# Patient Record
Sex: MALE | Race: WHITE | NOT HISPANIC OR LATINO | Employment: PART TIME | URBAN - METROPOLITAN AREA
[De-identification: names, ages, dates, MRNs, and addresses within clinical notes are randomized per-mention and may not be internally consistent; named-entity substitution may affect disease eponyms.]

---
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
## 2020-05-13 ENCOUNTER — TELEPHONE (OUTPATIENT)
Dept: INTERNAL MEDICINE | Facility: CLINIC | Age: 20
End: 2020-05-13

## 2020-05-13 NOTE — TELEPHONE ENCOUNTER
Please call pt and obtain where his last opth exam was.  If he has not had in the last one year then I will order one for him  If he has had one then I need to review and scan to chart.

## 2020-08-29 ENCOUNTER — TELEPHONE (OUTPATIENT)
Dept: INTERNAL MEDICINE | Facility: CLINIC | Age: 20
End: 2020-08-29

## 2020-08-29 DIAGNOSIS — E10.9 TYPE 1 DIABETES MELLITUS WITHOUT COMPLICATION (HCC): Primary | ICD-10-CM

## 2020-09-03 ENCOUNTER — TELEPHONE (OUTPATIENT)
Dept: INTERNAL MEDICINE | Facility: CLINIC | Age: 20
End: 2020-09-03

## 2020-09-03 NOTE — TELEPHONE ENCOUNTER
Attempted to reach patient at [phone] [facility] to call back. Gave office number

## 2020-10-13 ENCOUNTER — TELEPHONE (OUTPATIENT)
Dept: INTERNAL MEDICINE | Facility: CLINIC | Age: 20
End: 2020-10-13

## 2020-10-13 NOTE — TELEPHONE ENCOUNTER
----- Message from DIXIE Leung sent at 10/4/2020 10:11 PM EDT -----  Sent standard and certified.  If no response can we dismiss? TY  ----- Message -----  From: Kaitlynn Cobos  Sent: 8/31/2020   9:15 AM EDT  To: DIXIE Leung    I see where you have sent letters but I don't see that they have been sent via certified mail.  It could be we just never received the card back from the post office.  If you feel you sent a letter certified then yes we can dismiss from the practice.    Thanks  ----- Message -----  From: Bethany Townsend APRN  Sent: 8/29/2020   6:01 PM EDT  To: Kaitlynn Cobos    Would this person be up for dismissal as he has uncontrolled DM and I cannot get him in the office.

## 2020-12-03 DIAGNOSIS — T78.40XA ALLERGY, INITIAL ENCOUNTER: ICD-10-CM

## 2020-12-03 NOTE — TELEPHONE ENCOUNTER
LOV:12/04/2019  Next OV:n/a  Last Rx:12/04/2019  Quantity:30  Refills:11  Tried reaching Pt. Left voicemail message on [phone] and [phone] requesting Pt to call office verifying if he is still a Pt at the practice. Office number given [phone].

## 2020-12-09 NOTE — TELEPHONE ENCOUNTER
Lov:12/04/2019  Next OV:n/a  Last Rx:12/04/2019  Quantity:30  Refills:11  Left Pt voicemail message to call office to   1. Determine if he is still a Pt at the practice  2. Set up appointment since last OV was 12/04/2019  Office number given [phone].

## 2020-12-11 RX ORDER — MONTELUKAST SODIUM 10 MG/1
10 TABLET ORAL NIGHTLY
Qty: 30 TABLET | Refills: 10 | OUTPATIENT
Start: 2020-12-11

## 2021-01-07 ENCOUNTER — OFFICE VISIT (OUTPATIENT)
Dept: ENDOCRINOLOGY | Facility: CLINIC | Age: 21
End: 2021-01-07

## 2021-01-07 VITALS
DIASTOLIC BLOOD PRESSURE: 84 MMHG | OXYGEN SATURATION: 98 % | HEART RATE: 86 BPM | SYSTOLIC BLOOD PRESSURE: 128 MMHG | HEIGHT: 68 IN | BODY MASS INDEX: 29.67 KG/M2 | TEMPERATURE: 98.4 F | WEIGHT: 195.8 LBS

## 2021-01-07 DIAGNOSIS — E10.65 TYPE 1 DIABETES MELLITUS WITH HYPERGLYCEMIA (HCC): Primary | ICD-10-CM

## 2021-01-07 LAB
EXPIRATION DATE: NORMAL
HBA1C MFR BLD: 7.4 %
Lab: NORMAL

## 2021-01-07 PROCEDURE — 99213 OFFICE O/P EST LOW 20 MIN: CPT | Performed by: PHYSICIAN ASSISTANT

## 2021-01-07 PROCEDURE — 83036 HEMOGLOBIN GLYCOSYLATED A1C: CPT | Performed by: PHYSICIAN ASSISTANT

## 2021-01-07 PROCEDURE — 95251 CONT GLUC MNTR ANALYSIS I&R: CPT | Performed by: PHYSICIAN ASSISTANT

## 2021-01-07 RX ORDER — PROCHLORPERAZINE 25 MG/1
SUPPOSITORY RECTAL
COMMUNITY
Start: 2020-10-05 | End: 2021-01-07 | Stop reason: SDUPTHER

## 2021-01-07 RX ORDER — PROCHLORPERAZINE 25 MG/1
1 SUPPOSITORY RECTAL
Qty: 1 EACH | Refills: 3 | Status: SHIPPED | OUTPATIENT
Start: 2021-01-07 | End: 2022-05-19 | Stop reason: SDUPTHER

## 2021-01-07 RX ORDER — PROCHLORPERAZINE 25 MG/1
SUPPOSITORY RECTAL
COMMUNITY
Start: 2020-11-23 | End: 2021-01-07 | Stop reason: SDUPTHER

## 2021-01-07 RX ORDER — PROCHLORPERAZINE 25 MG/1
SUPPOSITORY RECTAL
Qty: 6 EACH | Refills: 3 | Status: SHIPPED | OUTPATIENT
Start: 2021-01-07 | End: 2021-10-19

## 2021-01-07 NOTE — PROGRESS NOTES
"     Office Note      Date: 2021  Patient Name: Darien Wylie  MRN: [id number]  : 2000    Chief Complaint   Patient presents with   • Diabetes       History of Present Illness:   Darien Wylie is a 20 y.o. male who presents for Type 1 diabetes.  He remains on his tandem insulin pump using NovoLog insulin and the Dexcom continuous glucose monitor.  He upgraded to control IQ over the summer.  He reports his blood sugars were little high over the holidays but overall they have been okay he does not feel the pump is giving him enough insulin to keep these down..  He denies any severe or frequent hypoglycemia.   He reports he is going to school and just got engaged they plan to get  in .  He had his annual eye exam  of this year and all was okay.  He is unsure if he had his flu vaccine this  or not but would not like to get one today.  He denies any trouble with his feet today.      Subjective     Review of Systems:   Review of Systems   Constitutional: Negative.    Cardiovascular: Negative.    Gastrointestinal: Negative.    Endocrine: Negative.    Neurological: Negative.        The following portions of the patient's history were reviewed and updated as appropriate: allergies, current medications, past family history, past medical history, past social history, past surgical history and problem list.    Objective     Vitals:    21 1523   BP: 128/84   Pulse: 86   Temp: 98.4 °F (36.9 °C)   TempSrc: Infrared   SpO2: 98%   Weight: 88.8 kg (195 lb 12.8 oz)   Height: 172.7 cm (68\")   PainSc: 0-No pain     Body mass index is 29.77 kg/m².    Physical Exam  Constitutional:       General: He is not in acute distress.     Appearance: Normal appearance.   Neurological:      Mental Status: He is alert.         HEMOGLOBIN A1C  Lab Results   Component Value Date    HGBA1C 7.4 2021           Assessment / Plan      Assessment & Plan:  1. Type 1 diabetes mellitus with " hyperglycemia (CMS/[facility])  A1c today 7.4%.  This is slightly above goal.  Reviewed tandem pump download with CGM tracings.  Increased his basal rate at 7 AM by 0.1 and changed his correction factor from 35 to 30 try to improve hyperglycemia.  He will send in blood sugar readings as needed for review and adjustment.  Refilled NovoLog today.  He inquired about getting his Dexcom supplies through [employer] company.  Advised him he will have to check into this with his insurance and he will let me know if he decides to change.  For now we will send a prescription to his pharmacy.  We will plan to check fasting labs and do foot exam next appointment for monitoring.  Blood pressure okay today  Encouraged healthy eating habits and physical activity.  - POC Glycosylated Hemoglobin (Hb A1C)      Return in about 3 months (around 4/7/2021) for Recheck.     Virgen Motta PA-C  01/07/2021

## 2021-04-08 ENCOUNTER — OFFICE VISIT (OUTPATIENT)
Dept: ENDOCRINOLOGY | Facility: CLINIC | Age: 21
End: 2021-04-08

## 2021-04-08 ENCOUNTER — LAB (OUTPATIENT)
Dept: LAB | Facility: HOSPITAL | Age: 21
End: 2021-04-08

## 2021-04-08 VITALS
HEIGHT: 69 IN | BODY MASS INDEX: 29.21 KG/M2 | DIASTOLIC BLOOD PRESSURE: 80 MMHG | WEIGHT: 197.2 LBS | SYSTOLIC BLOOD PRESSURE: 138 MMHG | TEMPERATURE: 98.2 F

## 2021-04-08 DIAGNOSIS — E10.65 TYPE 1 DIABETES MELLITUS WITH HYPERGLYCEMIA (HCC): ICD-10-CM

## 2021-04-08 DIAGNOSIS — R03.0 ELEVATED BLOOD PRESSURE READING: ICD-10-CM

## 2021-04-08 DIAGNOSIS — Z96.41 DIABETES MELLITUS TYPE 1, UNCOMPLICATED, ON LONG TERM INSULIN PUMP (HCC): ICD-10-CM

## 2021-04-08 DIAGNOSIS — E10.65 TYPE 1 DIABETES MELLITUS WITH HYPERGLYCEMIA (HCC): Primary | ICD-10-CM

## 2021-04-08 DIAGNOSIS — E10.9 DIABETES MELLITUS TYPE 1, UNCOMPLICATED, ON LONG TERM INSULIN PUMP (HCC): ICD-10-CM

## 2021-04-08 LAB
ALBUMIN SERPL-MCNC: 4.7 G/DL (ref 3.5–5.2)
ALBUMIN UR-MCNC: <1.2 MG/DL
ALBUMIN/GLOB SERPL: 1.7 G/DL
ALP SERPL-CCNC: 81 U/L (ref 39–117)
ALT SERPL W P-5'-P-CCNC: 19 U/L (ref 1–41)
ANION GAP SERPL CALCULATED.3IONS-SCNC: 9.7 MMOL/L (ref 5–15)
AST SERPL-CCNC: 16 U/L (ref 1–40)
BILIRUB SERPL-MCNC: 0.5 MG/DL (ref 0–1.2)
BUN SERPL-MCNC: 9 MG/DL (ref 6–20)
BUN/CREAT SERPL: 11.7 (ref 7–25)
CALCIUM SPEC-SCNC: 9.9 MG/DL (ref 8.6–10.5)
CHLORIDE SERPL-SCNC: 104 MMOL/L (ref 98–107)
CHOLEST SERPL-MCNC: 126 MG/DL (ref 0–200)
CO2 SERPL-SCNC: 27.3 MMOL/L (ref 22–29)
CREAT SERPL-MCNC: 0.77 MG/DL (ref 0.76–1.27)
CREAT UR-MCNC: 223.8 MG/DL
EXPIRATION DATE: NORMAL
GFR SERPL CREATININE-BSD FRML MDRD: 129 ML/MIN/1.73
GLOBULIN UR ELPH-MCNC: 2.7 GM/DL
GLUCOSE SERPL-MCNC: 81 MG/DL (ref 65–99)
HBA1C MFR BLD: 6.9 %
HDLC SERPL-MCNC: 58 MG/DL (ref 40–60)
LDLC SERPL CALC-MCNC: 56 MG/DL (ref 0–100)
LDLC/HDLC SERPL: 0.99 {RATIO}
Lab: NORMAL
MICROALBUMIN/CREAT UR: NORMAL MG/G{CREAT}
POTASSIUM SERPL-SCNC: 4.4 MMOL/L (ref 3.5–5.2)
PROT SERPL-MCNC: 7.4 G/DL (ref 6–8.5)
SODIUM SERPL-SCNC: 141 MMOL/L (ref 136–145)
TRIGL SERPL-MCNC: 53 MG/DL (ref 0–150)
TSH SERPL DL<=0.05 MIU/L-ACNC: 3.44 UIU/ML (ref 0.27–4.2)
VLDLC SERPL-MCNC: 12 MG/DL (ref 5–40)

## 2021-04-08 PROCEDURE — 99213 OFFICE O/P EST LOW 20 MIN: CPT | Performed by: PHYSICIAN ASSISTANT

## 2021-04-08 PROCEDURE — 82043 UR ALBUMIN QUANTITATIVE: CPT

## 2021-04-08 PROCEDURE — 80061 LIPID PANEL: CPT

## 2021-04-08 PROCEDURE — 80053 COMPREHEN METABOLIC PANEL: CPT

## 2021-04-08 PROCEDURE — 83036 HEMOGLOBIN GLYCOSYLATED A1C: CPT | Performed by: PHYSICIAN ASSISTANT

## 2021-04-08 PROCEDURE — 84443 ASSAY THYROID STIM HORMONE: CPT

## 2021-04-08 PROCEDURE — 95251 CONT GLUC MNTR ANALYSIS I&R: CPT | Performed by: PHYSICIAN ASSISTANT

## 2021-04-08 PROCEDURE — 82570 ASSAY OF URINE CREATININE: CPT

## 2021-04-08 RX ORDER — SUBCUTANEOUS INSULIN PUMP
EACH MISCELLANEOUS
COMMUNITY

## 2021-04-08 RX ORDER — MONTELUKAST SODIUM 10 MG/1
TABLET ORAL AS NEEDED
COMMUNITY
End: 2021-10-21 | Stop reason: HOSPADM
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
## 2021-06-08 RX ORDER — INFUSION SET FOR INSULIN PUMP
INFUSION SETS-PARAPHERNALIA MISCELLANEOUS
Qty: 45 EACH | Refills: 1 | Status: SHIPPED | OUTPATIENT
Start: 2021-06-08 | End: 2021-11-08

## 2021-06-08 RX ORDER — INSULIN PUMP CARTRIDGE
CARTRIDGE (EA) SUBCUTANEOUS
Qty: 45 EACH | Refills: 3 | OUTPATIENT
Start: 2021-06-08

## 2021-06-08 RX ORDER — INFUSION SET FOR INSULIN PUMP
1 INFUSION SETS-PARAPHERNALIA MISCELLANEOUS
Qty: 40 EACH | Refills: 3 | OUTPATIENT
Start: 2021-06-08

## 2021-06-08 RX ORDER — INSULIN PUMP CARTRIDGE
CARTRIDGE (EA) SUBCUTANEOUS
Qty: 45 EACH | Refills: 1 | Status: SHIPPED | OUTPATIENT
Start: 2021-06-08 | End: 2021-11-08

## 2021-06-11 ENCOUNTER — PRIOR AUTHORIZATION (OUTPATIENT)
Dept: ENDOCRINOLOGY | Facility: CLINIC | Age: 21
End: 2021-06-11

## 2021-06-11 NOTE — TELEPHONE ENCOUNTER
GUS ORDONEZ Key: [id number] - PA Case ID: [id number] help? Call us at [phone]  Outcome  Appro[place] Briana 10  Your PA request has been approved. Additional information will be provided in the approval communication. (Message 1145)  Drug  T:slim t:lock Insulin Cart 3ml  Form  [employer] Electronic PA Form (2017 [employer])

## 2021-07-13 ENCOUNTER — OFFICE VISIT (OUTPATIENT)
Dept: ENDOCRINOLOGY | Facility: CLINIC | Age: 21
End: 2021-07-13

## 2021-07-13 VITALS
HEIGHT: 68 IN | BODY MASS INDEX: 30.01 KG/M2 | OXYGEN SATURATION: 99 % | WEIGHT: 198 LBS | SYSTOLIC BLOOD PRESSURE: 128 MMHG | DIASTOLIC BLOOD PRESSURE: 64 MMHG | HEART RATE: 55 BPM

## 2021-07-13 DIAGNOSIS — E10.65 TYPE 1 DIABETES MELLITUS WITH HYPERGLYCEMIA (HCC): Primary | ICD-10-CM

## 2021-07-13 LAB
EXPIRATION DATE: ABNORMAL
EXPIRATION DATE: NORMAL
GLUCOSE BLDC GLUCOMTR-MCNC: 145 MG/DL (ref 70–130)
HBA1C MFR BLD: 7.1 %
Lab: ABNORMAL
Lab: NORMAL

## 2021-07-13 PROCEDURE — 83036 HEMOGLOBIN GLYCOSYLATED A1C: CPT | Performed by: PHYSICIAN ASSISTANT

## 2021-07-13 PROCEDURE — 99213 OFFICE O/P EST LOW 20 MIN: CPT | Performed by: PHYSICIAN ASSISTANT

## 2021-07-13 PROCEDURE — 95251 CONT GLUC MNTR ANALYSIS I&R: CPT | Performed by: PHYSICIAN ASSISTANT

## 2021-07-13 PROCEDURE — 3051F HG A1C>EQUAL 7.0%<8.0%: CPT | Performed by: PHYSICIAN ASSISTANT

## 2021-07-13 NOTE — PROGRESS NOTES
"     Office Note      Date: 2021  Patient Name: Darien Wylie  MRN: [id number]  : 2000    Chief Complaint   Patient presents with   • Diabetes       History of Present Illness:   Darien Wylie is a 20 y.o. male who presents for follow-up for type 1 diabetes.  He remains on his tandem insulin pump with G6 continuous glucose monitor and NovoLog insulin.  He got  in  and all went well.  He reports his blood sugars have been okay he has an occasional low blood sugar but does not have these on a regular basis.  He had trouble getting his tandem pump to download today but his control IQ is on.  Reports he has noted higher readings in the evenings and after lunch.  He is up-to-date on his eye exam he goes annually in January.  He has had his first dose of his Covid vaccine.  He denies any trouble with his feet today we did his foot exam and fasting labs at his appointment in April.    Subjective     Review of Systems:   Review of Systems   Constitutional: Negative.    Cardiovascular: Negative.    Endocrine: Negative.    Neurological: Negative.        The following portions of the patient's history were reviewed and updated as appropriate: allergies, current medications, past family history, past medical history, past social history, past surgical history and problem list.    Objective     Vitals:    21 0920   BP: 128/64   BP Location: Left arm   Patient Position: Sitting   Cuff Size: Adult   Pulse: 55   SpO2: 99%   Weight: 89.8 kg (198 lb)   Height: 172.7 cm (68\")   PainSc: 0-No pain     Body mass index is 30.11 kg/m².    Physical Exam    HEMOGLOBIN A1C  Lab Results   Component Value Date    HGBA1C 7.1 2021       GLUCOSE  Glucose   Date Value Ref Range Status   2021 145 (A) 70 - 130 mg/dL Final       CMP  Lab Results   Component Value Date    GLUCOSE 81 2021    BUN 9 2021    CREATININE 0.77 2021    EGFRIFNONA 129 2021    BCR 11.7 " 04/08/2021    K 4.4 04/08/2021    CO2 27.3 04/08/2021    CALCIUM 9.9 04/08/2021    AST 16 04/08/2021    ALT 19 04/08/2021       LIPID PANEL  Lab Results   Component Value Date    CHOL 126 04/08/2021    TRIG 53 04/08/2021    HDL 58 04/08/2021    LDL 56 04/08/2021       URINE MICROALBUMIN/CREATININE RATIO  Microalbumin/Creatinine Ratio   Date Value Ref Range Status   04/08/2021   Final     Comment:     Unable to calculate       TSH  Lab Results   Component Value Date    TSH 3.440 04/08/2021       Assessment / Plan      Assessment & Plan:  1. Type 1 diabetes mellitus with hyperglycemia (CMS/[facility])  His A1c is up some as compared to April at 7.1%.  I was able to review his Dexcom download and we will increase his bolus by 1 unit with lunch and supper.  He will send in blood sugars for review and adjustment as needed.  Refilled his insulin today.  Blood pressure was okay today.  His fiancée has been checking this at home and it is typically been in the 130s over 80 or less.  We will not plan to add any blood pressure medication at this time.  We will continue to monitor this.  Patient to call as needed  - POC Glycosylated Hemoglobin (Hb A1C)  - POC Glucose, Blood      Return in about 3 months (around 10/13/2021) for Recheck 3-4.     MARY Reeder-C  07/13/2021

## 2021-10-19 RX ORDER — PROCHLORPERAZINE 25 MG/1
SUPPOSITORY RECTAL
Qty: 6 EACH | Refills: 1 | Status: SHIPPED | OUTPATIENT
Start: 2021-10-19 | End: 2021-10-21

## 2021-10-21 ENCOUNTER — OFFICE VISIT (OUTPATIENT)
Dept: ENDOCRINOLOGY | Facility: CLINIC | Age: 21
End: 2021-10-21

## 2021-10-21 VITALS
HEART RATE: 61 BPM | BODY MASS INDEX: 29.37 KG/M2 | DIASTOLIC BLOOD PRESSURE: 68 MMHG | OXYGEN SATURATION: 98 % | WEIGHT: 193.8 LBS | HEIGHT: 68 IN | SYSTOLIC BLOOD PRESSURE: 122 MMHG

## 2021-10-21 DIAGNOSIS — E10.65 TYPE 1 DIABETES MELLITUS WITH HYPERGLYCEMIA (HCC): Primary | ICD-10-CM

## 2021-10-21 LAB
EXPIRATION DATE: ABNORMAL
EXPIRATION DATE: NORMAL
GLUCOSE BLDC GLUCOMTR-MCNC: 134 MG/DL (ref 70–130)
HBA1C MFR BLD: 6.7 %
Lab: ABNORMAL
Lab: NORMAL

## 2021-10-21 PROCEDURE — 82947 ASSAY GLUCOSE BLOOD QUANT: CPT | Performed by: PHYSICIAN ASSISTANT

## 2021-10-21 PROCEDURE — 99213 OFFICE O/P EST LOW 20 MIN: CPT | Performed by: PHYSICIAN ASSISTANT

## 2021-10-21 PROCEDURE — 83036 HEMOGLOBIN GLYCOSYLATED A1C: CPT | Performed by: PHYSICIAN ASSISTANT

## 2021-10-21 RX ORDER — PROCHLORPERAZINE 25 MG/1
SUPPOSITORY RECTAL
Qty: 9 EACH | Refills: 3 | Status: SHIPPED | OUTPATIENT
Start: 2021-10-21 | End: 2022-05-19 | Stop reason: SDUPTHER

## 2021-10-21 NOTE — PROGRESS NOTES
"     Office Note      Date: 10/21/2021  Patient Name: Darien Wylie  MRN: [id number]  : 2000    Chief Complaint   Patient presents with   • Diabetes       History of Present Illness:   Darien Wylie is a 21 y.o. male who presents for follow-up for type 1 diabetes.  The dates in his pump were off so we could not get this to download correctly.  Date was set to July instead of October.  He remains on the tandem insulin pump with Dexcom G6 continuous glucose monitor and NovoLog insulin.  He reports he has been off his Dexcom the last week because he was having trouble with his prescription.  He reports his blood sugars have been okay but he has noticed they are higher after he eats in the afternoons.  He denies any severe or frequent hypoglycemia.  He reports he is up-to-date on his eye exam he goes annually in January.  He has had both doses of his Covid vaccine and plans to get his flu vaccine this .  He denies any trouble with his feet today we did his foot exam as well as his fasting labs in the spring.      Subjective     Review of Systems:   Review of Systems   Constitutional: Negative.    Cardiovascular: Negative.    Gastrointestinal: Negative.    Endocrine: Negative.    Neurological: Negative.        The following portions of the patient's history were reviewed and updated as appropriate: allergies, current medications, past family history, past medical history, past social history, past surgical history and problem list.    Objective     Vitals:    10/21/21 1058   BP: 122/68   BP Location: Left arm   Patient Position: Sitting   Cuff Size: Adult   Pulse: 61   SpO2: 98%   Weight: 87.9 kg (193 lb 12.8 oz)   Height: 172.7 cm (68\")     Body mass index is 29.47 kg/m².    Physical Exam  Vitals reviewed.   Constitutional:       General: He is not in acute distress.     Appearance: Normal appearance.   Neurological:      Mental Status: He is alert.         HEMOGLOBIN A1C  Lab Results "   Component Value Date    HGBA1C 6.7 10/21/2021       GLUCOSE  Glucose   Date Value Ref Range Status   10/21/2021 134 (A) 70 - 130 mg/dL Final       CMP  Lab Results   Component Value Date    GLUCOSE 81 04/08/2021    BUN 9 04/08/2021    CREATININE 0.77 04/08/2021    EGFRIFNONA 129 04/08/2021    BCR 11.7 04/08/2021    K 4.4 04/08/2021    CO2 27.3 04/08/2021    CALCIUM 9.9 04/08/2021    AST 16 04/08/2021    ALT 19 04/08/2021       LIPID PANEL  Lab Results   Component Value Date    CHOL 126 04/08/2021    TRIG 53 04/08/2021    HDL 58 04/08/2021    LDL 56 04/08/2021       URINE MICROALBUMIN/CREATININE RATIO  Microalbumin/Creatinine Ratio   Date Value Ref Range Status   04/08/2021   Final     Comment:     Unable to calculate       TSH  Lab Results   Component Value Date    TSH 3.440 04/08/2021       Assessment / Plan      Assessment & Plan:  1. Type 1 diabetes mellitus with hyperglycemia (HCC)  His A1c is excellent today at 6.7%.  He has had some higher readings in the afternoons and evenings.  We changed his insulin to carb ratio from 1-8 to 1-6 during the day to try to improve hyperglycemia.  We are unsure of the accuracy of his tandem download since his date was set to July instead of October.  We corrected the date and time in his pump today.  I encouraged him to send in blood sugar readings for review and adjustment as needed.  I refilled his Dexcom sensors today.  Blood pressures okay today.  His weight is down 5 pounds since his appointment in July.  I encouraged healthy eating habits and physical activity as tolerated.  - POC Glycosylated Hemoglobin (Hb A1C)  - POC Glucose, Blood      Return in about 3 months (around 1/21/2022) for Recheck 3-4 mos.     Virgen Motta PA-C  10/21/2021

## 2021-11-08 RX ORDER — INFUSION SET FOR INSULIN PUMP
INFUSION SETS-PARAPHERNALIA MISCELLANEOUS
Qty: 45 EACH | Refills: 1 | Status: SHIPPED | OUTPATIENT
Start: 2021-11-08 | End: 2022-09-06

## 2021-11-08 RX ORDER — INSULIN PUMP CARTRIDGE
CARTRIDGE (EA) SUBCUTANEOUS
Qty: 45 EACH | Refills: 1 | Status: SHIPPED | OUTPATIENT
Start: 2021-11-08 | End: 2022-09-06

## 2021-11-16 ENCOUNTER — OFFICE VISIT (OUTPATIENT)
Dept: INTERNAL MEDICINE | Facility: CLINIC | Age: 21
End: 2021-11-16

## 2021-11-16 VITALS
TEMPERATURE: 97.1 F | HEART RATE: 77 BPM | DIASTOLIC BLOOD PRESSURE: 76 MMHG | WEIGHT: 195 LBS | BODY MASS INDEX: 29.55 KG/M2 | SYSTOLIC BLOOD PRESSURE: 118 MMHG | HEIGHT: 68 IN | RESPIRATION RATE: 20 BRPM | OXYGEN SATURATION: 98 %

## 2021-11-16 DIAGNOSIS — N50.812 PAIN IN BOTH TESTICLES: Primary | ICD-10-CM

## 2021-11-16 DIAGNOSIS — N50.82 SCROTAL PAIN: ICD-10-CM

## 2021-11-16 DIAGNOSIS — K64.4 EXTERNAL HEMORRHOID: ICD-10-CM

## 2021-11-16 DIAGNOSIS — N50.811 PAIN IN BOTH TESTICLES: Primary | ICD-10-CM

## 2021-11-16 PROBLEM — Z96.41 PRESENCE OF INSULIN PUMP: Status: ACTIVE | Noted: 2019-02-28

## 2021-11-16 LAB
BILIRUB BLD-MCNC: NEGATIVE MG/DL
CLARITY, POC: CLEAR
COLOR UR: YELLOW
EXPIRATION DATE: ABNORMAL
GLUCOSE UR STRIP-MCNC: NEGATIVE MG/DL
KETONES UR QL: NEGATIVE
LEUKOCYTE EST, POC: NEGATIVE
Lab: ABNORMAL
NITRITE UR-MCNC: NEGATIVE MG/ML
PH UR: 6 [PH] (ref 5–8)
PROT UR STRIP-MCNC: NEGATIVE MG/DL
RBC # UR STRIP: NEGATIVE /UL
SP GR UR: 1.03 (ref 1–1.03)
UROBILINOGEN UR QL: NORMAL

## 2021-11-16 PROCEDURE — 81003 URINALYSIS AUTO W/O SCOPE: CPT | Performed by: PHYSICIAN ASSISTANT

## 2021-11-16 PROCEDURE — 99213 OFFICE O/P EST LOW 20 MIN: CPT | Performed by: PHYSICIAN ASSISTANT

## 2021-11-16 NOTE — PROGRESS NOTES
[facility]  [address]  [facility] [address]  Dept: [phone]  Dept Fax: [phone]  Loc: [phone]  Loc Fax: [phone]    Darien Wylie  2000    Follow Up Office Visit Note    History of Present Illness:  Patient is a 21-year-old male in today for anal pain that radiates to his testicles.  Patient states that this started last week.  Patient reports a sharp pain that lasts only a few seconds it comes and goes.  Patient denies any aggravation or alleviation of symptoms.  Patient denies any blood in stool.      The following portions of the patient's history were reviewed and updated as appropriate: allergies, current medications, past family history, past medical history, past social history, past surgical history, and problem list.    Medications:    Current Outpatient Medications:   •  Continuous Blood Gluc Transmit (Dexcom G6 Transmitter) misc, 1 each by Other route Every 3 (Three) Months. Use as directed, change every 90 days, Disp: 1 each, Rfl: 3  •  insulin aspart (NovoLOG) 100 UNIT/ML injection, USE AS DIRECTED IN INSULIN PUMP MAX 80 UNITS/DAY, Disp: 80 mL, Rfl: 2  •  Insulin Infusion Pump (T:slim X2 Insulin Pump) device, t:slim X2 Insulin Pump  12a 1.25, 2a 1.4, 7a 1.25; carb ratio 1:8, correction 1:35, target 120; AIT 4h, Disp: , Rfl:   •  Insulin Infusion Pump Supplies (AutoSoft XC Infusion Set) misc, CHANGE EVERY TWO (2) DAYS, Disp: 45 each, Rfl: 1  •  Insulin Infusion Pump Supplies (T:slim X2 3mL Cartridge) misc, CHANGE EVERY TWO (2) DAYS, Disp: 45 each, Rfl: 1  •  Continuous Blood Gluc Sensor (Dexcom G6 Sensor), Use as directed, change every 10 days, 90 day supply, Disp: 9 each, Rfl: 3    Subjective  No Known Allergies     Past Medical History:   Diagnosis Date   • Allergic    • Diabetes mellitus type I (HCC)    • Presence of insulin pump    • Type 1 diabetes mellitus (HCC) 2013       Past Surgical History:  "  Procedure Laterality Date   • WISDOM TOOTH EXTRACTION         Family History   Problem Relation Age of Onset   • Asthma Mother    • ADD / ADHD Father    • Hyperlipidemia Father    • Hypertension Father    • Birth defects Maternal Grandmother    • Birth defects Paternal Grandmother         Social History     Socioeconomic History   • Marital status: Single   Tobacco Use   • Smoking status: Never Smoker   • Smokeless tobacco: Never Used   Vaping Use   • Vaping Use: Never used   Substance and Sexual Activity   • Alcohol use: No   • Drug use: No   • Sexual activity: Never       Review of Systems   Constitutional: Negative for activity change, chills, fatigue, fever and unexpected weight change.   HENT: Negative for congestion, ear pain, postnasal drip, sinus pressure and sore throat.    Eyes: Negative for pain, discharge and redness.   Respiratory: Negative for cough, shortness of breath and wheezing.    Cardiovascular: Negative for chest pain, palpitations and leg swelling.   Gastrointestinal: Positive for rectal pain. Negative for blood in stool, diarrhea, nausea and vomiting.   Endocrine: Negative for cold intolerance and heat intolerance.   Genitourinary: Positive for testicular pain. Negative for decreased urine volume and dysuria.   Musculoskeletal: Negative for arthralgias and myalgias.   Skin: Negative for rash and wound.   Neurological: Negative for dizziness, light-headedness and headaches.   Hematological: Does not bruise/bleed easily.   Psychiatric/Behavioral: Negative for confusion, dysphoric mood and sleep disturbance. The patient is not nervous/anxious.          Objective  Vitals:    11/16/21 1112   BP: 118/76   Pulse: 77   Resp: 20   Temp: 97.1 °F (36.2 °C)   TempSrc: Temporal   SpO2: 98%   Weight: 88.5 kg (195 lb)   Height: 172.7 cm (68\")     Body mass index is 29.65 kg/m².     Physical Exam  Physical Exam  Vitals and nursing note reviewed.   Constitutional:       General: He is not in acute " distress.     Appearance: He is not ill-appearing.   HENT:      Head: Normocephalic.      Right Ear: Tympanic membrane, ear canal and external ear normal. There is no impacted cerumen.      Left Ear: Tympanic membrane, ear canal and external ear normal. There is no impacted cerumen.      Nose: No congestion or rhinorrhea.      Mouth/Throat:      Mouth: Mucous membranes are moist.      Pharynx: Oropharynx is clear. No oropharyngeal exudate or posterior oropharyngeal erythema.   Eyes:      General:         Right eye: No discharge.         Left eye: No discharge.      Extraocular Movements: Extraocular movements intact.      Conjunctiva/sclera: Conjunctivae normal.      Pupils: Pupils are equal, round, and reactive to light.   Cardiovascular:      Rate and Rhythm: Normal rate and regular rhythm.      Heart sounds: Normal heart sounds. No murmur heard.  No friction rub. No gallop.    Pulmonary:      Effort: Pulmonary effort is normal. No respiratory distress.      Breath sounds: Normal breath sounds. No wheezing.   Abdominal:      General: Bowel sounds are normal. There is no distension.      Palpations: Abdomen is soft. There is no mass.      Tenderness: There is no abdominal tenderness.   Genitourinary:     Comments: 1 small external hemorrhoid noted on exam.  Scrotum normal.  Penis normal.  Musculoskeletal:         General: No swelling. Normal range of motion.      Cervical back: Normal range of motion. No tenderness.      Right lower leg: No edema.      Left lower leg: No edema.   Lymphadenopathy:      Cervical: No cervical adenopathy.   Skin:     Findings: No bruising, erythema or rash.   Neurological:      Mental Status: He is oriented to person, place, and time.      Gait: Gait normal.   Psychiatric:         Mood and Affect: Mood normal.         Behavior: Behavior normal.         Thought Content: Thought content normal.         Judgment: Judgment normal.         Diagnostic  Data  Procedures    Assessment  Diagnoses and all orders for this visit:    1. Pain in both testicles (Primary)  -     POCT urinalysis dipstick, automated    2. Scrotal pain  -     US scrotum and testicles; Future    3. External hemorrhoid        Plan    1. Pain in both testicles (Primary)- obtain UA.    2. Scrotal pain- obtain ultrasound.    3. External hemorrhoid- advised on fiber and hydration.        Return if symptoms worsen or fail to improve, for Next scheduled follow up.    Chandler Atkins PA-C  11/16/2021

## 2021-11-26 ENCOUNTER — APPOINTMENT (OUTPATIENT)
Dept: ULTRASOUND IMAGING | Facility: HOSPITAL | Age: 21
End: 2021-11-26

## 2022-01-31 ENCOUNTER — OFFICE VISIT (OUTPATIENT)
Dept: ENDOCRINOLOGY | Facility: CLINIC | Age: 22
End: 2022-01-31

## 2022-01-31 VITALS
BODY MASS INDEX: 30.46 KG/M2 | HEIGHT: 68 IN | DIASTOLIC BLOOD PRESSURE: 77 MMHG | OXYGEN SATURATION: 97 % | HEART RATE: 99 BPM | WEIGHT: 201 LBS | SYSTOLIC BLOOD PRESSURE: 114 MMHG

## 2022-01-31 DIAGNOSIS — E10.65 TYPE 1 DIABETES MELLITUS WITH HYPERGLYCEMIA: Primary | ICD-10-CM

## 2022-01-31 LAB
EXPIRATION DATE: ABNORMAL
EXPIRATION DATE: NORMAL
GLUCOSE BLDC GLUCOMTR-MCNC: 139 MG/DL (ref 70–130)
HBA1C MFR BLD: 6.7 %
Lab: ABNORMAL
Lab: NORMAL

## 2022-01-31 PROCEDURE — 83036 HEMOGLOBIN GLYCOSYLATED A1C: CPT | Performed by: PHYSICIAN ASSISTANT

## 2022-01-31 PROCEDURE — 95251 CONT GLUC MNTR ANALYSIS I&R: CPT | Performed by: PHYSICIAN ASSISTANT

## 2022-01-31 PROCEDURE — 99213 OFFICE O/P EST LOW 20 MIN: CPT | Performed by: PHYSICIAN ASSISTANT

## 2022-01-31 NOTE — PROGRESS NOTES
"     Office Note      Date: 2022  Patient Name: Darien Wylie  MRN: [id number]  : 2000    Chief Complaint   Patient presents with   • Diabetes       History of Present Illness:   Darien Wylie is a 21 y.o. male who presents for follow-up for type 1 diabetes. He remains on the Tandem insulin pump with Dexcom G6 continuous glucose monitoring.  He reports overall his blood glucose readings have been good.  He reports he started eating healthy and exercising the last week.  He had a low blood glucose yesterday evening-- it was 50mg/dl.  He denies any severe or frequent hypoglycemia.  He reports he occasionally gets elevated blood sugars but this is typically if he has a bad pump site.  He reports changing his insulin to carb ratio from 1-8 to 1-6 last appointment seems to have helped.  He reports he and his wife will graduate in May.  He reports he already has a job lined up.  He is up-to-date on his eye exam but is due now we will get this scheduled.  He has had his COVID vaccine but has not had his flu vaccine this fall.  He denies any trouble with his feet today.      Subjective     Review of Systems:   Review of Systems   Constitutional: Negative.    Cardiovascular: Negative.    Gastrointestinal: Negative.    Endocrine: Negative.    Neurological: Negative.        The following portions of the patient's history were reviewed and updated as appropriate: allergies, current medications, past family history, past medical history, past social history, past surgical history and problem list.    Objective     Vitals:    22 1308   BP: 114/77   Pulse: 99   SpO2: 97%   Weight: 91.2 kg (201 lb)   Height: 172.7 cm (68\")     Body mass index is 30.56 kg/m².    Physical Exam  Vitals reviewed.   Constitutional:       General: He is not in acute distress.     Appearance: Normal appearance.   Neurological:      Mental Status: He is alert.         HEMOGLOBIN A1C  Lab Results   Component Value " Date    HGBA1C 6.7 01/31/2022       GLUCOSE  Glucose   Date Value Ref Range Status   01/31/2022 139 (A) 70 - 130 mg/dL Final       CMP  Lab Results   Component Value Date    GLUCOSE 81 04/08/2021    BUN 9 04/08/2021    CREATININE 0.77 04/08/2021    EGFRIFNONA 129 04/08/2021    BCR 11.7 04/08/2021    K 4.4 04/08/2021    CO2 27.3 04/08/2021    CALCIUM 9.9 04/08/2021    AST 16 04/08/2021    ALT 19 04/08/2021       LIPID PANEL  Lab Results   Component Value Date    CHOL 126 04/08/2021    TRIG 53 04/08/2021    HDL 58 04/08/2021    LDL 56 04/08/2021       URINE MICROALBUMIN/CREATININE RATIO  Microalbumin/Creatinine Ratio   Date Value Ref Range Status   04/08/2021   Final     Comment:     Unable to calculate       TSH  Lab Results   Component Value Date    TSH 3.440 04/08/2021       Assessment / Plan      Assessment & Plan:  1. Type 1 diabetes mellitus with hyperglycemia (HCC)  His A1c today is excellent at 6.7%.  I reviewed his tandem download.  He has had some elevated blood sugars and his blood sugars seem to be higher in the afternoons though he reports these have been better recently.  We did not make any changes in his pump settings today.  For now he will continue working on healthier eating habits and physical activity.  His blood pressure is excellent today.  His weight is up 7 pounds since his appointment in October.  He will be due for labs as well as his foot exam at his follow-up appointment in 3 to 4 months for monitoring.  His cholesterol numbers were okay last year so we will not check fasting labs this appointment.  He did not need any prescriptions today.  I encouraged him to get his eye exam scheduled.  - POC Glucose, Blood  - POC Glycosylated Hemoglobin (Hb A1C)      Return in about 3 months (around 4/30/2022) for Recheck 3-4 mos.     This note was dictated using Dragon voice recognition.    Virgen Motta PA-C  01/31/2022

## 2022-05-16 ENCOUNTER — LAB (OUTPATIENT)
Dept: LAB | Facility: HOSPITAL | Age: 22
End: 2022-05-16

## 2022-05-16 ENCOUNTER — OFFICE VISIT (OUTPATIENT)
Dept: INTERNAL MEDICINE | Facility: CLINIC | Age: 22
End: 2022-05-16

## 2022-05-16 VITALS
BODY MASS INDEX: 29.55 KG/M2 | WEIGHT: 195 LBS | OXYGEN SATURATION: 98 % | DIASTOLIC BLOOD PRESSURE: 74 MMHG | HEIGHT: 68 IN | SYSTOLIC BLOOD PRESSURE: 110 MMHG | HEART RATE: 83 BPM | TEMPERATURE: 96.9 F

## 2022-05-16 DIAGNOSIS — E10.65 TYPE 1 DIABETES MELLITUS WITH HYPERGLYCEMIA: Primary | ICD-10-CM

## 2022-05-16 DIAGNOSIS — R79.89 ABNORMAL THYROID BLOOD TEST: ICD-10-CM

## 2022-05-16 DIAGNOSIS — Z00.00 ROUTINE GENERAL MEDICAL EXAMINATION AT A HEALTH CARE FACILITY: ICD-10-CM

## 2022-05-16 LAB
ALBUMIN SERPL-MCNC: 4.8 G/DL (ref 3.5–5.2)
ALBUMIN UR-MCNC: <1.2 MG/DL
ALBUMIN/GLOB SERPL: 2.2 G/DL
ALP SERPL-CCNC: 72 U/L (ref 39–117)
ALT SERPL W P-5'-P-CCNC: 14 U/L (ref 1–41)
ANION GAP SERPL CALCULATED.3IONS-SCNC: 14 MMOL/L (ref 5–15)
AST SERPL-CCNC: 16 U/L (ref 1–40)
BILIRUB BLD-MCNC: NEGATIVE MG/DL
BILIRUB SERPL-MCNC: 0.7 MG/DL (ref 0–1.2)
BUN SERPL-MCNC: 10 MG/DL (ref 6–20)
BUN/CREAT SERPL: 12.8 (ref 7–25)
CALCIUM SPEC-SCNC: 9.4 MG/DL (ref 8.6–10.5)
CHLORIDE SERPL-SCNC: 102 MMOL/L (ref 98–107)
CHOLEST SERPL-MCNC: 138 MG/DL (ref 0–200)
CLARITY, POC: CLEAR
CO2 SERPL-SCNC: 23 MMOL/L (ref 22–29)
COLOR UR: YELLOW
CREAT SERPL-MCNC: 0.78 MG/DL (ref 0.76–1.27)
CREAT UR-MCNC: 121.7 MG/DL
DEPRECATED RDW RBC AUTO: 39.5 FL (ref 37–54)
EGFRCR SERPLBLD CKD-EPI 2021: 130.1 ML/MIN/1.73
ERYTHROCYTE [DISTWIDTH] IN BLOOD BY AUTOMATED COUNT: 12.5 % (ref 12.3–15.4)
EXPIRATION DATE: ABNORMAL
GLOBULIN UR ELPH-MCNC: 2.2 GM/DL
GLUCOSE SERPL-MCNC: 116 MG/DL (ref 65–99)
GLUCOSE UR STRIP-MCNC: NEGATIVE MG/DL
HCT VFR BLD AUTO: 48.9 % (ref 37.5–51)
HDLC SERPL-MCNC: 58 MG/DL (ref 40–60)
HGB BLD-MCNC: 16.2 G/DL (ref 13–17.7)
KETONES UR QL: ABNORMAL
LDLC SERPL CALC-MCNC: 69 MG/DL (ref 0–100)
LDLC/HDLC SERPL: 1.22 {RATIO}
LEUKOCYTE EST, POC: NEGATIVE
Lab: ABNORMAL
MCH RBC QN AUTO: 29.1 PG (ref 26.6–33)
MCHC RBC AUTO-ENTMCNC: 33.1 G/DL (ref 31.5–35.7)
MCV RBC AUTO: 87.8 FL (ref 79–97)
MICROALBUMIN/CREAT UR: NORMAL MG/G{CREAT}
NITRITE UR-MCNC: NEGATIVE MG/ML
PH UR: 6 [PH] (ref 5–8)
PLATELET # BLD AUTO: 213 10*3/MM3 (ref 140–450)
PMV BLD AUTO: 9.5 FL (ref 6–12)
POTASSIUM SERPL-SCNC: 3.7 MMOL/L (ref 3.5–5.2)
PROT SERPL-MCNC: 7 G/DL (ref 6–8.5)
PROT UR STRIP-MCNC: NEGATIVE MG/DL
RBC # BLD AUTO: 5.57 10*6/MM3 (ref 4.14–5.8)
RBC # UR STRIP: NEGATIVE /UL
SODIUM SERPL-SCNC: 139 MMOL/L (ref 136–145)
SP GR UR: 1.02 (ref 1–1.03)
TRIGL SERPL-MCNC: 47 MG/DL (ref 0–150)
TSH SERPL DL<=0.05 MIU/L-ACNC: 4.37 UIU/ML (ref 0.27–4.2)
UROBILINOGEN UR QL: NORMAL
VIT B12 BLD-MCNC: 585 PG/ML (ref 211–946)
VLDLC SERPL-MCNC: 11 MG/DL (ref 5–40)
WBC NRBC COR # BLD: 7.15 10*3/MM3 (ref 3.4–10.8)

## 2022-05-16 PROCEDURE — 99395 PREV VISIT EST AGE 18-39: CPT | Performed by: INTERNAL MEDICINE

## 2022-05-16 PROCEDURE — 82607 VITAMIN B-12: CPT | Performed by: INTERNAL MEDICINE

## 2022-05-16 PROCEDURE — 82570 ASSAY OF URINE CREATININE: CPT | Performed by: INTERNAL MEDICINE

## 2022-05-16 PROCEDURE — 80061 LIPID PANEL: CPT | Performed by: INTERNAL MEDICINE

## 2022-05-16 PROCEDURE — 82043 UR ALBUMIN QUANTITATIVE: CPT | Performed by: INTERNAL MEDICINE

## 2022-05-16 PROCEDURE — 81003 URINALYSIS AUTO W/O SCOPE: CPT | Performed by: INTERNAL MEDICINE

## 2022-05-16 PROCEDURE — 80050 GENERAL HEALTH PANEL: CPT | Performed by: INTERNAL MEDICINE

## 2022-05-16 NOTE — PROGRESS NOTES
Patient is a 21 y.o. male who is here for a physical.  Chief Complaint   Patient presents with   • Annual Exam         HPI:    Here for CPE.      History:     Patient Active Problem List   Diagnosis   • Diabetes mellitus type I (HCC)   • Presence of insulin pump   • Type 1 diabetes mellitus (HCC)       Past Medical History:   Diagnosis Date   • Allergic    • Diabetes mellitus type I (HCC)    • Presence of insulin pump    • Type 1 diabetes mellitus (HCC) 2013       Past Surgical History:   Procedure Laterality Date   • WISDOM TOOTH EXTRACTION         Current Outpatient Medications on File Prior to Visit   Medication Sig   • insulin aspart (NovoLOG) 100 UNIT/ML injection USE AS DIRECTED IN INSULIN PUMP MAX 80 UNITS/DAY   • Continuous Blood Gluc Sensor (Dexcom G6 Sensor) Use as directed, change every 10 days, 90 day supply   • Continuous Blood Gluc Transmit (Dexcom G6 Transmitter) misc 1 each by Other route Every 3 (Three) Months. Use as directed, change every 90 days   • Insulin Infusion Pump (T:slim X2 Insulin Pump) device t:slim X2 Insulin Pump   12a 1.25, 2a 1.4, 7a 1.25; carb ratio 1:8, correction 1:35, target 120; AIT 4h   • Insulin Infusion Pump Supplies (AutoSoft XC Infusion Set) misc CHANGE EVERY TWO (2) DAYS   • Insulin Infusion Pump Supplies (T:slim X2 3mL Cartridge) misc CHANGE EVERY TWO (2) DAYS     No current facility-administered medications on file prior to visit.       Family History   Problem Relation Age of Onset   • Asthma Mother    • ADD / ADHD Father    • Hyperlipidemia Father    • Hypertension Father    • Birth defects Maternal Grandmother    • Birth defects Paternal Grandmother        Social History     Socioeconomic History   • Marital status:    Tobacco Use   • Smoking status: Never Smoker   • Smokeless tobacco: Never Used   Vaping Use   • Vaping Use: Never used   Substance and Sexual Activity   • Alcohol use: No   • Drug use: No   • Sexual activity: Never         Review of Systems  "  Constitutional: Negative for chills, fatigue, fever and unexpected weight change.   HENT: Negative for congestion, ear pain, hearing loss, rhinorrhea, sinus pressure, sore throat and trouble swallowing.    Eyes: Negative for discharge and itching.   Respiratory: Negative for cough, chest tightness and shortness of breath.    Cardiovascular: Negative for chest pain, palpitations and leg swelling.   Gastrointestinal: Negative for abdominal pain, blood in stool, constipation, diarrhea and vomiting.   Endocrine: Negative for polydipsia and polyuria.   Genitourinary: Negative for difficulty urinating, dysuria, enuresis, frequency, hematuria and urgency.   Musculoskeletal: Negative for arthralgias, back pain, gait problem and joint swelling.   Skin: Negative for rash and wound.   Allergic/Immunologic: Negative for immunocompromised state.   Neurological: Positive for dizziness and light-headedness. Negative for syncope, weakness, numbness and headaches.   Hematological: Does not bruise/bleed easily.   Psychiatric/Behavioral: Negative for behavioral problems, dysphoric mood and sleep disturbance. The patient is not nervous/anxious.        /74   Pulse 83   Temp 96.9 °F (36.1 °C) (Infrared)   Ht 172.7 cm (67.99\")   Wt 88.5 kg (195 lb)   SpO2 98%   BMI 29.66 kg/m²       Physical Exam  Constitutional:       Appearance: Normal appearance. He is well-developed.   HENT:      Head: Normocephalic and atraumatic.      Right Ear: External ear normal.      Left Ear: External ear normal.      Nose: Nose normal.      Mouth/Throat:      Mouth: Mucous membranes are moist.      Pharynx: Oropharynx is clear.   Eyes:      Extraocular Movements: Extraocular movements intact.      Conjunctiva/sclera: Conjunctivae normal.      Pupils: Pupils are equal, round, and reactive to light.   Cardiovascular:      Rate and Rhythm: Normal rate and regular rhythm.      Heart sounds: Normal heart sounds.   Pulmonary:      Effort: Pulmonary " effort is normal.      Breath sounds: Normal breath sounds.   Abdominal:      General: Bowel sounds are normal.      Palpations: Abdomen is soft.   Genitourinary:     Penis: Normal.       Testes: Normal.   Musculoskeletal:         General: Normal range of motion.      Cervical back: Normal range of motion and neck supple.   Lymphadenopathy:      Cervical: No cervical adenopathy.   Skin:     General: Skin is warm and dry.   Neurological:      General: No focal deficit present.      Mental Status: He is alert and oriented to person, place, and time.   Psychiatric:         Mood and Affect: Mood normal.         Behavior: Behavior normal.         Thought Content: Thought content normal.         Procedure:      Discussion/Summary:    HME-counseled on diet and exercise, fasting labs today  DM-good control    5/16 labs reviewed and dw patient, will recheck Tsh in 3-6 months    Reviewed the following with the patient: advised patient to avoid alcoholic beverages, encouraged patient to exercise 5-7 days per week for 30 minutes at a time, ideal body weight discussed with patient and weight loss encouraged.      Current Outpatient Medications:   •  insulin aspart (NovoLOG) 100 UNIT/ML injection, USE AS DIRECTED IN INSULIN PUMP MAX 80 UNITS/DAY, Disp: 30 mL, Rfl: 5  •  Continuous Blood Gluc Sensor (Dexcom G6 Sensor), Use as directed, change every 10 days, 90 day supply, Disp: 9 each, Rfl: 3  •  Continuous Blood Gluc Transmit (Dexcom G6 Transmitter) misc, 1 each by Other route Every 3 (Three) Months. Use as directed, change every 90 days, Disp: 1 each, Rfl: 3  •  Insulin Infusion Pump (T:slim X2 Insulin Pump) device, t:slim X2 Insulin Pump  12a 1.25, 2a 1.4, 7a 1.25; carb ratio 1:8, correction 1:35, target 120; AIT 4h, Disp: , Rfl:   •  Insulin Infusion Pump Supplies (AutoSoft XC Infusion Set) misc, CHANGE EVERY TWO (2) DAYS, Disp: 45 each, Rfl: 1  •  Insulin Infusion Pump Supplies (T:slim X2 3mL Cartridge) misc, CHANGE EVERY TWO  (2) DAYS, Disp: 45 each, Rfl: 1        Diagnoses and all orders for this visit:    1. Type 1 diabetes mellitus with hyperglycemia (HCC) (Primary)  -     Microalbumin / Creatinine Urine Ratio - Urine, Clean Catch    2. Routine general medical examination at a health care facility  -     CBC (No Diff)  -     Comprehensive Metabolic Panel  -     Lipid Panel  -     TSH  -     Vitamin B12  -     Microalbumin / Creatinine Urine Ratio - Urine, Clean Catch  -     POC Urinalysis Dipstick, Automated    3. Abnormal thyroid blood test  -     TSH; Future

## 2022-05-17 ENCOUNTER — TELEPHONE (OUTPATIENT)
Dept: INTERNAL MEDICINE | Facility: CLINIC | Age: 22
End: 2022-05-17

## 2022-05-19 ENCOUNTER — OFFICE VISIT (OUTPATIENT)
Dept: ENDOCRINOLOGY | Facility: CLINIC | Age: 22
End: 2022-05-19

## 2022-05-19 VITALS
WEIGHT: 196 LBS | DIASTOLIC BLOOD PRESSURE: 64 MMHG | OXYGEN SATURATION: 98 % | SYSTOLIC BLOOD PRESSURE: 108 MMHG | HEART RATE: 73 BPM | HEIGHT: 69 IN | BODY MASS INDEX: 29.03 KG/M2

## 2022-05-19 DIAGNOSIS — R79.89 ABNORMAL TSH: ICD-10-CM

## 2022-05-19 DIAGNOSIS — E10.65 TYPE 1 DIABETES MELLITUS WITH HYPERGLYCEMIA: Primary | ICD-10-CM

## 2022-05-19 DIAGNOSIS — Z96.41 PRESENCE OF INSULIN PUMP: ICD-10-CM

## 2022-05-19 LAB
EXPIRATION DATE: ABNORMAL
EXPIRATION DATE: NORMAL
GLUCOSE BLDC GLUCOMTR-MCNC: 165 MG/DL (ref 70–130)
HBA1C MFR BLD: 6.9 %
Lab: ABNORMAL
Lab: NORMAL

## 2022-05-19 PROCEDURE — 95251 CONT GLUC MNTR ANALYSIS I&R: CPT | Performed by: PHYSICIAN ASSISTANT

## 2022-05-19 PROCEDURE — 83036 HEMOGLOBIN GLYCOSYLATED A1C: CPT | Performed by: PHYSICIAN ASSISTANT

## 2022-05-19 PROCEDURE — 99214 OFFICE O/P EST MOD 30 MIN: CPT | Performed by: PHYSICIAN ASSISTANT

## 2022-05-19 RX ORDER — PROCHLORPERAZINE 25 MG/1
1 SUPPOSITORY RECTAL
Qty: 1 EACH | Refills: 3 | Status: SHIPPED | OUTPATIENT
Start: 2022-05-19

## 2022-05-19 RX ORDER — PROCHLORPERAZINE 25 MG/1
SUPPOSITORY RECTAL
Qty: 9 EACH | Refills: 3 | Status: SHIPPED | OUTPATIENT
Start: 2022-05-19

## 2022-05-19 NOTE — PROGRESS NOTES
"     Office Note      Date: 2022  Patient Name: Darien Wylie  MRN: [id number]  : 2000    Chief Complaint   Patient presents with   • Diabetes       History of Present Illness:   Darien Wylie is a 21 y.o. male who presents for follow-up for type 1 diabetes.  He remains on the tandem insulin pump with Dexcom G6 continuous glucose monitor.  He graduated this month and is getting ready to start a new job as a  at his [employer].  His wife graduated as well.  He reports his blood sugars have been okay though up and down some.  He reports he and his wife has been trying to eat better and stay active.  He has a pending eye appointment next month.  He just had fasting labs done with his primary care physician earlier this month.  He denies any trouble with his feet today.      Subjective     Review of Systems:   Review of Systems   Constitutional: Negative.    Cardiovascular: Negative.    Gastrointestinal: Negative.    Endocrine: Negative.    Neurological: Negative.        The following portions of the patient's history were reviewed and updated as appropriate: allergies, current medications, past family history, past medical history, past social history, past surgical history and problem list.    Objective     Vitals:    22 1430   BP: 108/64   Pulse: 73   SpO2: 98%   Weight: 88.9 kg (196 lb)   Height: 175.3 cm (69\")   PainSc: 0-No pain     Body mass index is 28.94 kg/m².    Physical Exam  Vitals reviewed.   Constitutional:       General: He is not in acute distress.     Appearance: Normal appearance.   Cardiovascular:      Pulses:           Dorsalis pedis pulses are 2+ on the right side and 2+ on the left side.        Posterior tibial pulses are 2+ on the right side and 2+ on the left side.   Musculoskeletal:      Right foot: No deformity.      Left foot: No deformity.   Feet:      Right foot:      Protective Sensation: 5 sites tested. 5 sites sensed.      Skin " integrity: Skin integrity normal.      Toenail Condition: Right toenails are normal.      Left foot:      Protective Sensation: 5 sites tested. 5 sites sensed.      Skin integrity: Skin integrity normal.      Toenail Condition: Left toenails are normal.      Comments: Diabetic Foot Exam Performed and Monofilament Test Performed    Neurological:      Mental Status: He is alert.         HEMOGLOBIN A1C  Lab Results   Component Value Date    HGBA1C 6.9 05/19/2022       GLUCOSE  Glucose   Date Value Ref Range Status   05/19/2022 165 (A) 70 - 130 mg/dL Final       CMP  Lab Results   Component Value Date    GLUCOSE 116 (H) 05/16/2022    BUN 10 05/16/2022    CREATININE 0.78 05/16/2022    EGFRIFNONA 129 04/08/2021    BCR 12.8 05/16/2022    K 3.7 05/16/2022    CO2 23.0 05/16/2022    CALCIUM 9.4 05/16/2022    AST 16 05/16/2022    ALT 14 05/16/2022       LIPID PANEL  Lab Results   Component Value Date    CHOL 138 05/16/2022    TRIG 47 05/16/2022    HDL 58 05/16/2022    LDL 69 05/16/2022       URINE MICROALBUMIN/CREATININE RATIO  Microalbumin/Creatinine Ratio   Date Value Ref Range Status   05/16/2022   Final     Comment:     Unable to calculate       TSH  Lab Results   Component Value Date    TSH 4.370 (H) 05/16/2022       Assessment / Plan      Assessment & Plan:  1. Type 1 diabetes mellitus with hyperglycemia (HCC)  His A1c today is excellent at 6.9%.  This is up some as compared to his appointment in January.  I reviewed his tandem insulin pump download with continuous glucose monitor readings today.  He has had some higher readings in the afternoons and evenings.  We changed his insulin to carb ratio from 1-6 to 1-5 to try to improve some of his hyperglycemia.  He will send in blood sugar readings for review and adjustment if needed.  His blood pressure is okay today.  Weight is down 5 pounds since his appointment in January.  I encouraged healthy eating habits and physical activity as tolerated.  His fasting labs from  earlier this month look good.  His urine microalbumin creatinine ratio could not be calculated.  TSH was mildly elevated.    - POC Glycosylated Hemoglobin (Hb A1C)  - POC Glucose, Blood    2. Abnormal TSH  Had a mildly elevated TSH earlier this month.  His mom has a history of thyroid issues.  We will plan to check a TSH and free T4 at his follow-up appointment for monitoring.  He currently is not having any symptoms of hypothyroidism.    3. Presence of insulin pump        Return in about 3 months (around 8/19/2022) for Recheck 3-4 mos.     This note was dictated using Dragon voice recognition.    Virgen Motta PA-C  05/19/2022

## 2022-09-06 RX ORDER — INFUSION SET FOR INSULIN PUMP
INFUSION SETS-PARAPHERNALIA MISCELLANEOUS
Qty: 45 EACH | Refills: 3 | Status: SHIPPED | OUTPATIENT
Start: 2022-09-06

## 2022-09-06 RX ORDER — INSULIN PUMP CARTRIDGE
CARTRIDGE (EA) SUBCUTANEOUS
Qty: 45 EACH | Refills: 3 | Status: SHIPPED | OUTPATIENT
Start: 2022-09-06

## 2022-10-11 ENCOUNTER — PRIOR AUTHORIZATION (OUTPATIENT)
Dept: ENDOCRINOLOGY | Facility: CLINIC | Age: 22
End: 2022-10-11

## 2022-10-11 NOTE — TELEPHONE ENCOUNTER
Approvedtoday  Your PA request has been approved. Additional information will be provided in the approval communication. (Message 1145)  Drug  Dexcom G6 Sensor  Form  [employer] Electronic PA Form (2017 [employer])

## 2022-12-09 ENCOUNTER — TELEMEDICINE (OUTPATIENT)
Dept: FAMILY MEDICINE CLINIC | Facility: TELEHEALTH | Age: 22
End: 2022-12-09

## 2022-12-09 DIAGNOSIS — J20.9 ACUTE BRONCHITIS, UNSPECIFIED ORGANISM: Primary | ICD-10-CM

## 2022-12-09 PROCEDURE — 99213 OFFICE O/P EST LOW 20 MIN: CPT | Performed by: NURSE PRACTITIONER

## 2022-12-09 RX ORDER — AZITHROMYCIN 250 MG/1
TABLET, FILM COATED ORAL
Qty: 6 TABLET | Refills: 0 | Status: SHIPPED | OUTPATIENT
Start: 2022-12-09 | End: 2023-01-23

## 2022-12-09 RX ORDER — BENZONATATE 200 MG/1
200 CAPSULE ORAL 3 TIMES DAILY PRN
Qty: 20 CAPSULE | Refills: 0 | Status: SHIPPED | OUTPATIENT
Start: 2022-12-09 | End: 2023-01-23

## 2022-12-09 RX ORDER — DEXTROMETHORPHAN HYDROBROMIDE AND PROMETHAZINE HYDROCHLORIDE 15; 6.25 MG/5ML; MG/5ML
5 SYRUP ORAL NIGHTLY PRN
Qty: 120 ML | Refills: 0 | Status: SHIPPED | OUTPATIENT
Start: 2022-12-09

## 2022-12-09 RX ORDER — INSULIN ASPART 100 [IU]/ML
INJECTION, SOLUTION INTRAVENOUS; SUBCUTANEOUS
COMMUNITY
Start: 2022-12-06 | End: 2023-01-23 | Stop reason: SDUPTHER

## 2022-12-09 NOTE — PROGRESS NOTES
CHIEF COMPLAINT  Chief Complaint   Patient presents with   • Cough         HPI  Darien Wylie is a 22 y.o. male  presents with complaint of cough and chest congestion. He is concerned since he has diabetes this maybe a pulmonary infection.     Review of Systems   Constitutional: Negative for chills, diaphoresis, fatigue and fever.   HENT: Negative for congestion, postnasal drip, sinus pressure, sneezing and sore throat.    Respiratory: Positive for cough and chest tightness. Negative for shortness of breath and wheezing.    Cardiovascular: Negative for chest pain.   Gastrointestinal: Negative for diarrhea, nausea and vomiting.   Musculoskeletal: Negative for myalgias.   Neurological: Negative for headaches.       Past Medical History:   Diagnosis Date   • Allergic    • Diabetes mellitus type I (HCC)    • Presence of insulin pump    • Type 1 diabetes mellitus (HCC) 2013       Family History   Problem Relation Age of Onset   • Asthma Mother    • ADD / ADHD Father    • Hyperlipidemia Father    • Hypertension Father    • Birth defects Maternal Grandmother    • Birth defects Paternal Grandmother        Social History     Socioeconomic History   • Marital status:    Tobacco Use   • Smoking status: Never   • Smokeless tobacco: Never   Vaping Use   • Vaping Use: Never used   Substance and Sexual Activity   • Alcohol use: No   • Drug use: No   • Sexual activity: Never       Darien Wylie  reports that he has never smoked. He has never used smokeless tobacco..       There were no vitals taken for this visit.    PHYSICAL EXAM  Physical Exam   Constitutional: He is oriented to person, place, and time. He appears well-developed and well-nourished. He does not have a sickly appearance. He does not appear ill. No distress.   HENT:   Head: Normocephalic and atraumatic.   Eyes: EOM are normal.   Neck: Neck normal appearance.  Pulmonary/Chest: Effort normal.  No respiratory distress.  Neurological: He is  alert and oriented to person, place, and time.   Skin: Skin is dry.   Psychiatric: He has a normal mood and affect.         Diagnoses and all orders for this visit:    1. Acute bronchitis, unspecified organism (Primary)    Other orders  -     azithromycin (Zithromax Z-Jared) 250 MG tablet; Take 2 tablets by mouth on day 1, then 1 tablet daily on days 2-5  Dispense: 6 tablet; Refill: 0  -     benzonatate (TESSALON) 200 MG capsule; Take 1 capsule by mouth 3 (Three) Times a Day As Needed for Cough.  Dispense: 20 capsule; Refill: 0  -     promethazine-dextromethorphan (PROMETHAZINE-DM) 6.25-15 MG/5ML syrup; Take 5 mL by mouth At Night As Needed for Cough.  Dispense: 120 mL; Refill: 0    bacterial vs viral. Likely viral and did discuss options of just waiting and watching.     The use of a video visit has been reviewed with the patient and verbal informed consent has een obtained. Myself and Darien Wylie participated in this visit. The patient is located in [address] [place] [address]. I am located in [place], [place]. Mychart and Zoom were utilized.       Note Disclaimer: At [facility], we believe that sharing information builds trust and better   relationships. You are receiving this note because you recently visited [facility]. It is possible you   will see health information before a provider has talked with you about it. This kind of information can   be easy to misunderstand. To help you fully understand what it means for your health, we urge you to   discuss this note with your provider.    Mine Montoya, DIXIE  12/09/2022  14:31 EST

## 2023-01-23 ENCOUNTER — OFFICE VISIT (OUTPATIENT)
Dept: ENDOCRINOLOGY | Facility: CLINIC | Age: 23
End: 2023-01-23
Payer: COMMERCIAL

## 2023-01-23 VITALS
HEART RATE: 78 BPM | DIASTOLIC BLOOD PRESSURE: 69 MMHG | BODY MASS INDEX: 30.07 KG/M2 | SYSTOLIC BLOOD PRESSURE: 110 MMHG | HEIGHT: 69 IN | WEIGHT: 203 LBS | OXYGEN SATURATION: 98 %

## 2023-01-23 DIAGNOSIS — R79.89 ABNORMAL TSH: ICD-10-CM

## 2023-01-23 DIAGNOSIS — E10.65 TYPE 1 DIABETES MELLITUS WITH HYPERGLYCEMIA: Primary | ICD-10-CM

## 2023-01-23 LAB
EXPIRATION DATE: NORMAL
EXPIRATION DATE: NORMAL
GLUCOSE BLDC GLUCOMTR-MCNC: 85 MG/DL (ref 70–130)
HBA1C MFR BLD: 7.3 %
Lab: NORMAL
Lab: NORMAL

## 2023-01-23 PROCEDURE — 99214 OFFICE O/P EST MOD 30 MIN: CPT | Performed by: PHYSICIAN ASSISTANT

## 2023-01-23 PROCEDURE — 83036 HEMOGLOBIN GLYCOSYLATED A1C: CPT | Performed by: PHYSICIAN ASSISTANT

## 2023-01-23 PROCEDURE — 95251 CONT GLUC MNTR ANALYSIS I&R: CPT | Performed by: PHYSICIAN ASSISTANT

## 2023-01-23 PROCEDURE — 84443 ASSAY THYROID STIM HORMONE: CPT | Performed by: PHYSICIAN ASSISTANT

## 2023-01-23 PROCEDURE — 84439 ASSAY OF FREE THYROXINE: CPT | Performed by: PHYSICIAN ASSISTANT

## 2023-01-23 RX ORDER — INSULIN ASPART 100 [IU]/ML
INJECTION, SOLUTION INTRAVENOUS; SUBCUTANEOUS
Qty: 30 ML | Refills: 5 | Status: SHIPPED | OUTPATIENT
Start: 2023-01-23

## 2023-01-23 NOTE — PROGRESS NOTES
"     Office Note      Date: 2023  Patient Name: Darien Wylie  MRN: [id number]  : 2000    Chief Complaint   Patient presents with   • Diabetes       History of Present Illness:   Darien Wylie is a 22 y.o. male who presents for follow-up for type 1 diabetes.  It has been 8 months since his last appointment.  He remains on the tandem insulin pump with Dexcom G6 continuous glucose monitor.  He reports he received and noticed that he is due to update his tandem pump on the computer and he is also due for an upgrade.  He reports he plans to continue the tandem pump so he will contact tandem to get the upgrade started.  He is up-to-date on his eye exam he goes for annual appointments in the summer.  We did his foot exam at his appointment in May.  He denies any trouble with his feet today.  He is up-to-date on labs these were completed in May.  His TSH was mildly elevated in May we plan to recheck his labs today.  He reports his new job as a  is going well and he and his wife just bought their first house.      Subjective     Review of Systems:   Review of Systems   Constitutional: Negative.    Cardiovascular: Negative.    Gastrointestinal: Negative.    Endocrine: Negative.    Neurological: Negative.        The following portions of the patient's history were reviewed and updated as appropriate: allergies, current medications, past family history, past medical history, past social history, past surgical history and problem list.    Objective     Vitals:    23 1432   BP: 110/69   Pulse: 78   SpO2: 98%   Weight: 92.1 kg (203 lb)   Height: 175.3 cm (69\")     Body mass index is 29.98 kg/m².    Physical Exam  Vitals reviewed.   Constitutional:       General: He is not in acute distress.     Appearance: Normal appearance.   Neurological:      Mental Status: He is alert.         HEMOGLOBIN A1C  Lab Results   Component Value Date    HGBA1C 7.3 2023 "       GLUCOSE  Glucose   Date Value Ref Range Status   01/23/2023 85 70 - 130 mg/dL Final       CMP  Lab Results   Component Value Date    GLUCOSE 116 (H) 05/16/2022    BUN 10 05/16/2022    CREATININE 0.78 05/16/2022    EGFRIFNONA 129 04/08/2021    BCR 12.8 05/16/2022    K 3.7 05/16/2022    CO2 23.0 05/16/2022    CALCIUM 9.4 05/16/2022    AST 16 05/16/2022    ALT 14 05/16/2022       LIPID PANEL  Lab Results   Component Value Date    CHOL 138 05/16/2022    TRIG 47 05/16/2022    HDL 58 05/16/2022    LDL 69 05/16/2022       URINE MICROALBUMIN/CREATININE RATIO  Microalbumin/Creatinine Ratio   Date Value Ref Range Status   05/16/2022   Final     Comment:     Unable to calculate       TSH  Lab Results   Component Value Date    TSH 4.370 (H) 05/16/2022       Assessment / Plan      Assessment & Plan:  1. Type 1 diabetes mellitus with hyperglycemia (HCC)  His hemoglobin A1c today is up as compared to May at 7.3%.  I reviewed his tandem download with continuous glucose monitor readings today.  His blood sugars are spiking during the day and it looks like he is not entering his carbohydrates for meals.  We discussed the importance of bolusing regularly.  We did not make any changes to his pump settings today.  He will send in his blood sugar readings for review and adjustment as needed.  His weight is up 7 pounds since his appointment in May.  I encouraged healthy eating habits and physical activity as tolerated.  His blood pressure is good today.    - POC Glucose, Blood  - POC Glycosylated Hemoglobin (Hb A1C)    2. Abnormal TSH  TFTs pending today.  Will send note with results and plan.  - TSH; Future  - T4, Free; Future        Return in about 3 months (around 4/23/2023) for Recheck 3-4 mos.     This note was dictated using Dragon voice recognition.    Virgen Motta PA-C  01/23/2023

## 2023-01-24 LAB
T4 FREE SERPL-MCNC: 1.32 NG/DL (ref 0.93–1.7)
TSH SERPL DL<=0.05 MIU/L-ACNC: 2.34 UIU/ML (ref 0.27–4.2)

## 2023-04-19 RX ORDER — PROCHLORPERAZINE 25 MG/1
SUPPOSITORY RECTAL
Qty: 9 EACH | Refills: 2 | Status: SHIPPED | OUTPATIENT
Start: 2023-04-19

## 2023-05-18 ENCOUNTER — LAB (OUTPATIENT)
Dept: LAB | Facility: HOSPITAL | Age: 23
End: 2023-05-18
Payer: COMMERCIAL

## 2023-05-18 ENCOUNTER — OFFICE VISIT (OUTPATIENT)
Dept: INTERNAL MEDICINE | Facility: CLINIC | Age: 23
End: 2023-05-18
Payer: COMMERCIAL

## 2023-05-18 VITALS
WEIGHT: 203 LBS | DIASTOLIC BLOOD PRESSURE: 70 MMHG | BODY MASS INDEX: 30.07 KG/M2 | HEART RATE: 71 BPM | HEIGHT: 69 IN | SYSTOLIC BLOOD PRESSURE: 120 MMHG | OXYGEN SATURATION: 98 %

## 2023-05-18 DIAGNOSIS — Z00.00 ROUTINE GENERAL MEDICAL EXAMINATION AT A HEALTH CARE FACILITY: ICD-10-CM

## 2023-05-18 DIAGNOSIS — E10.9 TYPE 1 DIABETES MELLITUS WITHOUT COMPLICATION: Primary | ICD-10-CM

## 2023-05-18 LAB
ALBUMIN UR-MCNC: <1.2 MG/DL
BILIRUB BLD-MCNC: NEGATIVE MG/DL
CLARITY, POC: CLEAR
COLOR UR: YELLOW
CREAT UR-MCNC: 177.8 MG/DL
DEPRECATED RDW RBC AUTO: 37.4 FL (ref 37–54)
ERYTHROCYTE [DISTWIDTH] IN BLOOD BY AUTOMATED COUNT: 12.3 % (ref 12.3–15.4)
EXPIRATION DATE: ABNORMAL
GLUCOSE UR STRIP-MCNC: NEGATIVE MG/DL
HBA1C MFR BLD: 7.2 % (ref 4.8–5.6)
HCT VFR BLD AUTO: 47.5 % (ref 37.5–51)
HGB BLD-MCNC: 16.2 G/DL (ref 13–17.7)
KETONES UR QL: ABNORMAL
LEUKOCYTE EST, POC: NEGATIVE
Lab: ABNORMAL
MCH RBC QN AUTO: 28.8 PG (ref 26.6–33)
MCHC RBC AUTO-ENTMCNC: 34.1 G/DL (ref 31.5–35.7)
MCV RBC AUTO: 84.4 FL (ref 79–97)
MICROALBUMIN/CREAT UR: NORMAL MG/G{CREAT}
NITRITE UR-MCNC: NEGATIVE MG/ML
PH UR: 6.5 [PH] (ref 5–8)
PLATELET # BLD AUTO: 258 10*3/MM3 (ref 140–450)
PMV BLD AUTO: 9.4 FL (ref 6–12)
PROT UR STRIP-MCNC: NEGATIVE MG/DL
RBC # BLD AUTO: 5.63 10*6/MM3 (ref 4.14–5.8)
RBC # UR STRIP: NEGATIVE /UL
SP GR UR: 1.02 (ref 1–1.03)
UROBILINOGEN UR QL: NORMAL
VIT B12 BLD-MCNC: 594 PG/ML (ref 211–946)
WBC NRBC COR # BLD: 6.03 10*3/MM3 (ref 3.4–10.8)

## 2023-05-18 PROCEDURE — 80050 GENERAL HEALTH PANEL: CPT | Performed by: INTERNAL MEDICINE

## 2023-05-18 PROCEDURE — 80061 LIPID PANEL: CPT | Performed by: INTERNAL MEDICINE

## 2023-05-18 PROCEDURE — 82570 ASSAY OF URINE CREATININE: CPT | Performed by: INTERNAL MEDICINE

## 2023-05-18 PROCEDURE — 82043 UR ALBUMIN QUANTITATIVE: CPT | Performed by: INTERNAL MEDICINE

## 2023-05-18 PROCEDURE — 82607 VITAMIN B-12: CPT | Performed by: INTERNAL MEDICINE

## 2023-05-18 PROCEDURE — 83036 HEMOGLOBIN GLYCOSYLATED A1C: CPT | Performed by: INTERNAL MEDICINE

## 2023-05-18 NOTE — PROGRESS NOTES
Patient is a 22 y.o. male who is here for a physical.  Chief Complaint   Patient presents with   • Annual Exam         HPI:    Here for CPE.     History:     Patient Active Problem List   Diagnosis   • Diabetes mellitus type I   • Presence of insulin pump   • Type 1 diabetes mellitus       Past Medical History:   Diagnosis Date   • Allergic    • Diabetes mellitus type I    • Presence of insulin pump    • Type 1 diabetes mellitus 2013       Past Surgical History:   Procedure Laterality Date   • WISDOM TOOTH EXTRACTION         Current Outpatient Medications on File Prior to Visit   Medication Sig   • NovoLOG 100 UNIT/ML injection Use as directed in insulin pump MAX 80 units/day   • Continuous Blood Gluc Sensor (Dexcom G6 Sensor) USE AS DIRECTED, CHANGE EVERY 10 DAYS, 90 DAY SUPPLY   • Continuous Blood Gluc Transmit (Dexcom G6 Transmitter) misc 1 each by Other route Every 3 (Three) Months. Use as directed, change every 90 days   • Insulin Infusion Pump (T:slim X2 Insulin Pump) device t:slim X2 Insulin Pump   12a 1.25, 2a 1.4, 7a 1.25; carb ratio 1:8, correction 1:35, target 120; AIT 4h   • Insulin Infusion Pump Supplies (AutoSoft XC Infusion Set) misc CHANGE EVERY TWO (2) DAYS   • Insulin Infusion Pump Supplies (T:slim X2 3mL Cartridge) misc CHANGE EVERY TWO (2) DAYS   • [DISCONTINUED] promethazine-dextromethorphan (PROMETHAZINE-DM) 6.25-15 MG/5ML syrup Take 5 mL by mouth At Night As Needed for Cough.     No current facility-administered medications on file prior to visit.       Family History   Problem Relation Age of Onset   • Asthma Mother    • ADD / ADHD Father    • Hyperlipidemia Father    • Hypertension Father    • Birth defects Maternal Grandmother    • Birth defects Paternal Grandmother        Social History     Socioeconomic History   • Marital status:    Tobacco Use   • Smoking status: Never   • Smokeless tobacco: Never   Vaping Use   • Vaping Use: Never used   Substance and Sexual Activity   • Alcohol  "use: Yes     Comment: I would say I drink 1-2 alcoholic drinks a month   • Drug use: No   • Sexual activity: Yes     Partners: Female     Birth control/protection: OCP         Review of Systems   Constitutional: Negative for chills, fatigue, fever and unexpected weight change.   HENT: Negative for congestion, ear pain, hearing loss, rhinorrhea, sinus pressure, sore throat and trouble swallowing.    Eyes: Negative for discharge and itching.   Respiratory: Negative for cough, chest tightness and shortness of breath.    Cardiovascular: Negative for chest pain, palpitations and leg swelling.   Gastrointestinal: Negative for abdominal pain, blood in stool, constipation, diarrhea and vomiting.        AM \"bubble gut\"   Endocrine: Negative for polydipsia and polyuria.   Genitourinary: Negative for difficulty urinating, dysuria, enuresis, frequency, hematuria and urgency.   Musculoskeletal: Negative for arthralgias, back pain, gait problem and joint swelling.   Skin: Negative for rash and wound.   Allergic/Immunologic: Negative for immunocompromised state.   Neurological: Positive for dizziness (rare) and light-headedness (rare). Negative for syncope, weakness, numbness and headaches.   Hematological: Does not bruise/bleed easily.   Psychiatric/Behavioral: Negative for behavioral problems, dysphoric mood and sleep disturbance. The patient is not nervous/anxious.        /70   Pulse 71   Ht 175.3 cm (69.02\")   Wt 92.1 kg (203 lb)   SpO2 98%   BMI 29.96 kg/m²       Physical Exam  Constitutional:       Appearance: Normal appearance. He is well-developed.   HENT:      Head: Normocephalic and atraumatic.      Right Ear: External ear normal.      Left Ear: External ear normal.      Nose: Nose normal.      Mouth/Throat:      Mouth: Mucous membranes are moist.      Pharynx: Oropharynx is clear.   Eyes:      Extraocular Movements: Extraocular movements intact.      Conjunctiva/sclera: Conjunctivae normal.      Pupils: " Pupils are equal, round, and reactive to light.   Cardiovascular:      Rate and Rhythm: Normal rate and regular rhythm.      Heart sounds: Normal heart sounds.   Pulmonary:      Effort: Pulmonary effort is normal.      Breath sounds: Normal breath sounds.   Abdominal:      General: Bowel sounds are normal.      Palpations: Abdomen is soft.   Genitourinary:     Penis: Normal.       Testes: Normal.   Musculoskeletal:         General: Normal range of motion.      Cervical back: Normal range of motion and neck supple.   Lymphadenopathy:      Cervical: No cervical adenopathy.   Skin:     General: Skin is warm and dry.   Neurological:      General: No focal deficit present.      Mental Status: He is alert and oriented to person, place, and time.   Psychiatric:         Mood and Affect: Mood normal.         Behavior: Behavior normal.         Thought Content: Thought content normal.         Procedure:      Discussion/Summary:    HME-counseled on diet and exercise, fasting labs today  DM-fair control     5/18 labs reviewed and dw patient     Reviewed the following with the patient: advised patient to avoid alcoholic beverages, encouraged patient to exercise 5-7 days per week for 30 minutes at a time, ideal body weight discussed with patient and weight loss encouraged.    Current Outpatient Medications:   •  NovoLOG 100 UNIT/ML injection, Use as directed in insulin pump MAX 80 units/day, Disp: 30 mL, Rfl: 5  •  Continuous Blood Gluc Sensor (Dexcom G6 Sensor), USE AS DIRECTED, CHANGE EVERY 10 DAYS, 90 DAY SUPPLY, Disp: 9 each, Rfl: 2  •  Continuous Blood Gluc Transmit (Dexcom G6 Transmitter) misc, 1 each by Other route Every 3 (Three) Months. Use as directed, change every 90 days, Disp: 1 each, Rfl: 3  •  Insulin Infusion Pump (T:slim X2 Insulin Pump) device, t:slim X2 Insulin Pump  12a 1.25, 2a 1.4, 7a 1.25; carb ratio 1:8, correction 1:35, target 120; AIT 4h, Disp: , Rfl:   •  Insulin Infusion Pump Supplies (AutoSoft XC  Infusion Set) misc, CHANGE EVERY TWO (2) DAYS, Disp: 45 each, Rfl: 3  •  Insulin Infusion Pump Supplies (T:slim X2 3mL Cartridge) misc, CHANGE EVERY TWO (2) DAYS, Disp: 45 each, Rfl: 3        Diagnoses and all orders for this visit:    1. Type 1 diabetes mellitus without complication (Primary)  -     Microalbumin / Creatinine Urine Ratio - Urine, Clean Catch  -     POC Urinalysis Dipstick, Automated  -     Hemoglobin A1c    2. Routine general medical examination at a health care facility  -     CBC (No Diff)  -     Comprehensive Metabolic Panel  -     Lipid Panel  -     TSH  -     Microalbumin / Creatinine Urine Ratio - Urine, Clean Catch  -     Vitamin B12  -     POC Urinalysis Dipstick, Automated  -     Hemoglobin A1c

## 2023-05-19 LAB
ALBUMIN SERPL-MCNC: 4.6 G/DL (ref 3.5–5.2)
ALBUMIN/GLOB SERPL: 1.6 G/DL
ALP SERPL-CCNC: 73 U/L (ref 39–117)
ALT SERPL W P-5'-P-CCNC: 19 U/L (ref 1–41)
ANION GAP SERPL CALCULATED.3IONS-SCNC: 9 MMOL/L (ref 5–15)
AST SERPL-CCNC: 20 U/L (ref 1–40)
BILIRUB SERPL-MCNC: 0.4 MG/DL (ref 0–1.2)
BUN SERPL-MCNC: 11 MG/DL (ref 6–20)
BUN/CREAT SERPL: 13.6 (ref 7–25)
CALCIUM SPEC-SCNC: 9.2 MG/DL (ref 8.6–10.5)
CHLORIDE SERPL-SCNC: 105 MMOL/L (ref 98–107)
CHOLEST SERPL-MCNC: 139 MG/DL (ref 0–200)
CO2 SERPL-SCNC: 25 MMOL/L (ref 22–29)
CREAT SERPL-MCNC: 0.81 MG/DL (ref 0.76–1.27)
EGFRCR SERPLBLD CKD-EPI 2021: 127.8 ML/MIN/1.73
GLOBULIN UR ELPH-MCNC: 2.8 GM/DL
GLUCOSE SERPL-MCNC: 144 MG/DL (ref 65–99)
HDLC SERPL-MCNC: 57 MG/DL (ref 40–60)
LDLC SERPL CALC-MCNC: 74 MG/DL (ref 0–100)
LDLC/HDLC SERPL: 1.33 {RATIO}
POTASSIUM SERPL-SCNC: 4.2 MMOL/L (ref 3.5–5.2)
PROT SERPL-MCNC: 7.4 G/DL (ref 6–8.5)
SODIUM SERPL-SCNC: 139 MMOL/L (ref 136–145)
TRIGL SERPL-MCNC: 32 MG/DL (ref 0–150)
TSH SERPL DL<=0.05 MIU/L-ACNC: 2.38 UIU/ML (ref 0.27–4.2)
VLDLC SERPL-MCNC: 8 MG/DL (ref 5–40)

## 2023-11-02 ENCOUNTER — PRIOR AUTHORIZATION (OUTPATIENT)
Dept: ENDOCRINOLOGY | Facility: CLINIC | Age: 23
End: 2023-11-02
Payer: COMMERCIAL

## 2023-11-02 ENCOUNTER — TELEPHONE (OUTPATIENT)
Dept: ENDOCRINOLOGY | Facility: CLINIC | Age: 23
End: 2023-11-02
Payer: COMMERCIAL

## 2023-11-02 RX ORDER — PROCHLORPERAZINE 25 MG/1
1 SUPPOSITORY RECTAL
Qty: 1 EACH | Refills: 3 | Status: SHIPPED | OUTPATIENT
Start: 2023-11-02

## 2023-11-02 RX ORDER — PROCHLORPERAZINE 25 MG/1
1 SUPPOSITORY RECTAL
Qty: 1 EACH | Refills: 2 | OUTPATIENT
Start: 2023-11-02

## 2023-11-02 NOTE — TELEPHONE ENCOUNTER
Rx Refill Note  Requested Prescriptions     Pending Prescriptions Disp Refills    Continuous Blood Gluc Transmit (Dexcom G6 Transmitter) misc [Pharmacy Med Name: DEXCOM G6 MIS TRANSMIT] 1 each 2     Si EACH BY OTHER ROUTE EVERY 3 (THREE) MONTHS. USE AS DIRECTED, CHANGE EVERY 90 DAYS      Last office visit with prescribing clinician: 2023     Next office visit with prescribing clinician: Visit date not found

## 2023-11-02 NOTE — TELEPHONE ENCOUNTER
Caller: Darien Wylie    Relationship: Self    Best call back number: [phone]     Requested Prescriptions:      Continuous Blood Gluc Transmit (Dexcom G6 Transmitter) [facility]     Pharmacy where request should be sent:  [facility] [place] - [address] [facility] [phone] [facility] [phone] [facility]      Last office visit with prescribing clinician: 1/23/2023     Next office visit with prescribing clinician: 11/14/2023     Additional details provided by patient:  PATIENT HAS BEEN WITHOUT A TRANSMITTER FOR A WEEK.    Does the patient have less than a 3 day supply:  [x] Yes  [] No    Would you like a call back once the refill request has been completed: [x] Yes [] No    If the office needs to give you a call back, can they leave a voicemail: [x] Yes [] No    Nikolai Mustafa Rep   11/02/23 12:47 EDT

## 2023-11-02 NOTE — TELEPHONE ENCOUNTER
GUS ORDONEZ Key: [id number] - PA Case ID: [id number] - Rx #: [id number] help? Call us at [phone]  Outcome  Approvedtoday  Your PA request has been approved. Additional information will be provided in the approval communication. (Message 1145)  Drug  Dexcom G6 Sensor  Form  [employer] Electronic PA Form (2017 [employer])

## 2023-11-21 RX ORDER — INSULIN ASPART 100 [IU]/ML
INJECTION, SOLUTION INTRAVENOUS; SUBCUTANEOUS
Qty: 30 ML | Refills: 3 | Status: SHIPPED | OUTPATIENT
Start: 2023-11-21

## 2023-11-21 NOTE — TELEPHONE ENCOUNTER
Rx Refill Note  Requested Prescriptions     Pending Prescriptions Disp Refills    NovoLOG 100 UNIT/ML injection [Pharmacy Med Name: NOVOLOG /ML] 30 mL 4     Sig: USE AS DIRECTED IN INSULIN PUMP MAX 80 UNITS/DAY      Last office visit with prescribing clinician: 1/23/2023   Last telemedicine visit with prescribing clinician: Visit date not found   Next office visit with prescribing clinician: Visit date not found                         Would you like a call back once the refill request has been completed: [] Yes [] No    If the office needs to give you a call back, can they leave a voicemail: [] Yes [] No    Kai Polk MA  11/21/23, 11:17 EST

## 2023-11-27 RX ORDER — INFUSION SET FOR INSULIN PUMP
1 INFUSION SETS-PARAPHERNALIA MISCELLANEOUS EVERY OTHER DAY
Qty: 45 EACH | Refills: 0 | Status: SHIPPED | OUTPATIENT
Start: 2023-11-27

## 2023-11-27 RX ORDER — INSULIN PUMP CARTRIDGE
1 CARTRIDGE (EA) SUBCUTANEOUS EVERY OTHER DAY
Qty: 45 EACH | Refills: 0 | Status: SHIPPED | OUTPATIENT
Start: 2023-11-27

## 2023-11-27 NOTE — TELEPHONE ENCOUNTER
"Rx Refill Note  Requested Prescriptions     Pending Prescriptions Disp Refills    Insulin Infusion Pump Supplies (T:slim X2 3mL Cartridge) misc [Pharmacy Med Name: T:SLIM X2 3 ML CARTRIDGE 3ML CART MISC] 50 each 9     Sig: CHANGE EVERY TWO (2) DAYS    Insulin Infusion Pump Supplies (AutoSoft XC Infusion Set) misc [Pharmacy Med Name: AUTOSOFT XC INFUSION SET/23\"/6MM 23\"/6MM MISC] 45 each 9     Sig: CHANGE EVERY TWO (2) DAYS        Last office visit with prescribing clinician: 1/23/2023      Next office visit with prescribing clinician: Visit date not found       Jessica Olmstead (Jodi)  11/27/23, 15:18 EST     NOTED NEEDS APPT FOR REFILLS  "

## 2023-12-06 ENCOUNTER — TELEPHONE (OUTPATIENT)
Dept: ENDOCRINOLOGY | Facility: CLINIC | Age: 23
End: 2023-12-06
Payer: COMMERCIAL

## 2023-12-06 NOTE — TELEPHONE ENCOUNTER
PATIENT CALLED REGARDING HIS INSULIN PRESCRIPTION. PATIENT STATES THAT HE ONLY GETS TWO VIALS AT A TIME FROM THE PHARMACY, WHICH LAST HIM 2-3 WEEKS. PATIENT WOULD LIKE TO DISCUSS HAVING HIS PRESCRIPTION INCREASED. PATIENT IS CONCERNED ABOUT THE AVAILABILITY OF INSULIN GIVEN CURRENT WORLD EVENTS. PATIENT WOULD ALSO LIKE TO DISCUSS GETTING INSULIN PENS INSTEAD OF VIALS IF THOSE WOULD BE EASIER TO OBTAIN.

## 2023-12-06 NOTE — TELEPHONE ENCOUNTER
Spoke with patient.  Advised we had not seen him since January 2023.  Patient states he has requested an appointment via [employer].  He has insulin at home.  Will call back if he needs insulin refill.

## 2023-12-11 ENCOUNTER — OFFICE VISIT (OUTPATIENT)
Dept: ENDOCRINOLOGY | Facility: CLINIC | Age: 23
End: 2023-12-11
Payer: COMMERCIAL

## 2023-12-11 VITALS
SYSTOLIC BLOOD PRESSURE: 120 MMHG | WEIGHT: 210 LBS | BODY MASS INDEX: 31.1 KG/M2 | HEIGHT: 69 IN | OXYGEN SATURATION: 98 % | DIASTOLIC BLOOD PRESSURE: 72 MMHG | HEART RATE: 73 BPM

## 2023-12-11 DIAGNOSIS — Z96.41 PRESENCE OF INSULIN PUMP: ICD-10-CM

## 2023-12-11 DIAGNOSIS — E10.65 TYPE 1 DIABETES MELLITUS WITH HYPERGLYCEMIA: Primary | ICD-10-CM

## 2023-12-11 LAB
EXPIRATION DATE: ABNORMAL
EXPIRATION DATE: NORMAL
GLUCOSE BLDC GLUCOMTR-MCNC: 103 MG/DL (ref 70–130)
HBA1C MFR BLD: 7.8 % (ref 4.5–5.7)
Lab: ABNORMAL
Lab: NORMAL

## 2023-12-11 RX ORDER — INSULIN ASPART 100 [IU]/ML
INJECTION, SOLUTION INTRAVENOUS; SUBCUTANEOUS
Qty: 30 ML | Refills: 6 | Status: SHIPPED | OUTPATIENT
Start: 2023-12-11

## 2023-12-11 RX ORDER — INSULIN ASPART 100 [IU]/ML
INJECTION, SOLUTION INTRAVENOUS; SUBCUTANEOUS
Qty: 15 ML | Refills: 1 | Status: SHIPPED | OUTPATIENT
Start: 2023-12-11

## 2023-12-11 RX ORDER — ACYCLOVIR 400 MG/1
1 TABLET ORAL
Qty: 3 EACH | Refills: 11 | Status: SHIPPED | OUTPATIENT
Start: 2023-12-11

## 2023-12-11 NOTE — PROGRESS NOTES
"     Office Note      Date: 2023  Patient Name: Darien Wylie  MRN: [id number]  : 2000    Chief Complaint   Patient presents with    Diabetes     Type 1 diabetes mellitus with hyperglycemia         History of Present Illness:   Darien Wylie is a 23 y.o. male who presents for follow-up for type 1 diabetes diagnosed in .  It has been almost a year since his last appointment.  He got the new tandem insulin pump in August and this seems to be working with no major issues.  He reports he has not been bolusing regularly and needs to get better about doing this.  He reports he has been bolusing more recently.  His date was off in his insulin pump today and set to July so we were unable to download much information.  He denies any severe or frequent hypoglycemia.  He has noted higher blood sugar readings during the day.  He is asking about using the Dexcom G7 continuous glucose monitor today.  He is up-to-date on his eye exam he will be due next month for an appointment.  His primary care physician checked labs in May.  He denies any trouble with his feet today.      Subjective     Review of Systems:   Review of Systems   Constitutional: Negative.    Cardiovascular: Negative.    Gastrointestinal: Negative.    Endocrine: Negative.    Neurological: Negative.        The following portions of the patient's history were reviewed and updated as appropriate: allergies, current medications, past family history, past medical history, past social history, past surgical history, and problem list.    Objective     Vitals:    23 0853   BP: 120/72   BP Location: Left arm   Patient Position: Sitting   Cuff Size: Adult   Pulse: 73   SpO2: 98%   Weight: 95.3 kg (210 lb)   Height: 175.3 cm (69\")     Body mass index is 31.01 kg/m².    Physical Exam  Vitals reviewed.   Constitutional:       General: He is not in acute distress.     Appearance: Normal appearance.   Cardiovascular:      Pulses:    "        Dorsalis pedis pulses are 2+ on the right side and 2+ on the left side.        Posterior tibial pulses are 2+ on the right side and 2+ on the left side.   Musculoskeletal:      Right foot: No deformity.      Left foot: No deformity.   Feet:      Right foot:      Protective Sensation: 5 sites tested.  5 sites sensed.      Skin integrity: Skin integrity normal.      Toenail Condition: Right toenails are normal.      Left foot:      Protective Sensation: 5 sites tested.  5 sites sensed.      Skin integrity: Skin integrity normal.      Toenail Condition: Left toenails are normal.      Comments: Diabetic Foot Exam Performed and Monofilament Test Performed      Neurological:      Mental Status: He is alert.         HEMOGLOBIN A1C  Lab Results   Component Value Date    HGBA1C 7.8 (A) 12/11/2023       GLUCOSE  Glucose   Date Value Ref Range Status   12/11/2023 103 70 - 130 mg/dL Final       CMP  Lab Results   Component Value Date    GLUCOSE 144 (H) 05/18/2023    BUN 11 05/18/2023    CREATININE 0.81 05/18/2023    EGFRIFNONA 129 04/08/2021    BCR 13.6 05/18/2023    K 4.2 05/18/2023    CO2 25.0 05/18/2023    CALCIUM 9.2 05/18/2023    AST 20 05/18/2023    ALT 19 05/18/2023       LIPID PANEL  Lab Results   Component Value Date    CHOL 139 05/18/2023    TRIG 32 05/18/2023    HDL 57 05/18/2023    LDL 74 05/18/2023       URINE MICROALBUMIN/CREATININE RATIO  Microalbumin/Creatinine Ratio   Date Value Ref Range Status   05/18/2023   Final     Comment:     Unable to calculate       TSH  Lab Results   Component Value Date    TSH 2.380 05/18/2023       Assessment / Plan      Assessment & Plan:  1. Type 1 diabetes mellitus with hyperglycemia  His hemoglobin A1c is up as compared to last year at 7.8%.  I reviewed his Dexcom download and overall his blood sugars come down overnight but tend to be higher during the day.  We discussed the importance of bolusing regularly.  He will get in the habit of doing this and reach out to me if  his blood sugars continue to remain elevated.  His weight is up 7 pounds since his appointment in January 2023.  I encouraged healthy eating habits and physical activity as tolerated.  I encouraged him to get his eye exam scheduled.  I refilled his insulin today and sent a prescription for NovoLog pens per his request.  We discussed that the pens may or may not be covered by his insurance since he already gets vials to fill his pump.  - POC Glycosylated Hemoglobin (Hb A1C)  - POC Glucose, Blood    2. Presence of insulin pump  I sent in a prescription for the Dexcom G7 continuous glucose monitors today.  We discussed that he will have to find into his tandem account and do the education to get his pump updated to use the G7.  He has G6 sensors on hand for now.      Return in about 4 months (around 4/11/2024) for Recheck.     This note was dictated using Dragon voice recognition.    Virgen Motta PA-C  12/11/2023

## 2024-02-19 RX ORDER — INSULIN ASPART 100 [IU]/ML
INJECTION, SOLUTION INTRAVENOUS; SUBCUTANEOUS
Qty: 15 ML | Refills: 1 | Status: SHIPPED | OUTPATIENT
Start: 2024-02-19

## 2024-02-19 NOTE — TELEPHONE ENCOUNTER
Rx Refill Note  Requested Prescriptions     Pending Prescriptions Disp Refills    NovoLOG FlexPen 100 UNIT/ML solution pen-injector sc pen [Pharmacy Med Name: Novolog Flexpen U-100 Insulin aspart 100 unit/mL (3 mL) subcutaneous] 15 mL 1     Sig: USE AS DIRECTED IN CASE OF PUMP FAILURE UP TO 50 UNITS/DAY          Last office visit with prescribing clinician: 12/11/2023     Next office visit with prescribing clinician: 4/22/2024         Araceli Pinon MA  02/19/24, 16:21 EST

## 2024-05-21 ENCOUNTER — OFFICE VISIT (OUTPATIENT)
Dept: INTERNAL MEDICINE | Facility: CLINIC | Age: 24
End: 2024-05-21
Payer: COMMERCIAL

## 2024-05-21 ENCOUNTER — LAB (OUTPATIENT)
Dept: INTERNAL MEDICINE | Facility: CLINIC | Age: 24
End: 2024-05-21
Payer: COMMERCIAL

## 2024-05-21 VITALS
HEIGHT: 69 IN | DIASTOLIC BLOOD PRESSURE: 74 MMHG | SYSTOLIC BLOOD PRESSURE: 120 MMHG | WEIGHT: 212 LBS | BODY MASS INDEX: 31.4 KG/M2 | OXYGEN SATURATION: 98 % | HEART RATE: 66 BPM

## 2024-05-21 DIAGNOSIS — E10.65 TYPE 1 DIABETES MELLITUS WITH HYPERGLYCEMIA: Primary | ICD-10-CM

## 2024-05-21 DIAGNOSIS — Z00.00 ROUTINE GENERAL MEDICAL EXAMINATION AT A HEALTH CARE FACILITY: ICD-10-CM

## 2024-05-21 DIAGNOSIS — E10.9 TYPE 1 DIABETES MELLITUS WITHOUT COMPLICATION: Primary | ICD-10-CM

## 2024-05-21 LAB
BILIRUB BLD-MCNC: NEGATIVE MG/DL
CLARITY, POC: ABNORMAL
COLOR UR: YELLOW
DEPRECATED RDW RBC AUTO: 39.2 FL (ref 37–54)
ERYTHROCYTE [DISTWIDTH] IN BLOOD BY AUTOMATED COUNT: 12.8 % (ref 12.3–15.4)
EXPIRATION DATE: ABNORMAL
GLUCOSE UR STRIP-MCNC: NEGATIVE MG/DL
HBA1C MFR BLD: 7.1 % (ref 4.8–5.6)
HCT VFR BLD AUTO: 49.4 % (ref 37.5–51)
HGB BLD-MCNC: 16.4 G/DL (ref 13–17.7)
KETONES UR QL: NEGATIVE
LEUKOCYTE EST, POC: NEGATIVE
Lab: ABNORMAL
MCH RBC QN AUTO: 28.8 PG (ref 26.6–33)
MCHC RBC AUTO-ENTMCNC: 33.2 G/DL (ref 31.5–35.7)
MCV RBC AUTO: 86.7 FL (ref 79–97)
NITRITE UR-MCNC: NEGATIVE MG/ML
PH UR: 8 [PH] (ref 5–8)
PLATELET # BLD AUTO: 250 10*3/MM3 (ref 140–450)
PMV BLD AUTO: 9.5 FL (ref 6–12)
PROT UR STRIP-MCNC: NEGATIVE MG/DL
RBC # BLD AUTO: 5.7 10*6/MM3 (ref 4.14–5.8)
RBC # UR STRIP: NEGATIVE /UL
SP GR UR: 1.01 (ref 1–1.03)
UROBILINOGEN UR QL: NORMAL
WBC NRBC COR # BLD AUTO: 5.99 10*3/MM3 (ref 3.4–10.8)

## 2024-05-21 PROCEDURE — 36415 COLL VENOUS BLD VENIPUNCTURE: CPT | Performed by: INTERNAL MEDICINE

## 2024-05-21 PROCEDURE — 82570 ASSAY OF URINE CREATININE: CPT | Performed by: INTERNAL MEDICINE

## 2024-05-21 PROCEDURE — 80061 LIPID PANEL: CPT | Performed by: INTERNAL MEDICINE

## 2024-05-21 PROCEDURE — 99395 PREV VISIT EST AGE 18-39: CPT | Performed by: INTERNAL MEDICINE

## 2024-05-21 PROCEDURE — 82607 VITAMIN B-12: CPT | Performed by: INTERNAL MEDICINE

## 2024-05-21 PROCEDURE — 80050 GENERAL HEALTH PANEL: CPT | Performed by: INTERNAL MEDICINE

## 2024-05-21 PROCEDURE — 81003 URINALYSIS AUTO W/O SCOPE: CPT | Performed by: INTERNAL MEDICINE

## 2024-05-21 PROCEDURE — 83036 HEMOGLOBIN GLYCOSYLATED A1C: CPT | Performed by: INTERNAL MEDICINE

## 2024-05-21 PROCEDURE — 82043 UR ALBUMIN QUANTITATIVE: CPT | Performed by: INTERNAL MEDICINE

## 2024-05-21 NOTE — PROGRESS NOTES
Patient is a 23 y.o. male who is here for a physical.  Chief Complaint   Patient presents with    Annual Exam         HPI:    Here for CPE.      History:     Patient Active Problem List   Diagnosis    Diabetes mellitus type I    Presence of insulin pump    Type 1 diabetes mellitus       Past Medical History:   Diagnosis Date    Allergic     Diabetes mellitus type I     Presence of insulin pump     Type 1 diabetes mellitus 2013       Past Surgical History:   Procedure Laterality Date    WISDOM TOOTH EXTRACTION         Current Outpatient Medications on File Prior to Visit   Medication Sig    Continuous Blood Gluc Sensor (Dexcom G7 Sensor) misc Use 1 each Every 10 (Ten) Days.    Insulin Aspart (NovoLOG) 100 UNIT/ML injection Use as directed in insulin pump MAX 95 units/day, NEW INSTRUCTIONS    Insulin Infusion Pump (T:slim X2 Insulin Pump) device t:slim X2 Insulin Pump   12a 1.25, 2a 1.4, 7a 1.25; carb ratio 1:8, correction 1:35, target 120; AIT 4h    Insulin Infusion Pump Supplies (AutoSoft XC Infusion Set) misc Use 1 each Every Other Day. NEEDS APPT FOR REFILL    Insulin Infusion Pump Supplies (T:slim X2 3mL Cartridge) misc Use 1 each Every Other Day. NEEDS APPT FOR REFILL    NovoLOG FlexPen 100 UNIT/ML solution pen-injector sc pen USE AS DIRECTED IN CASE OF PUMP FAILURE UP TO 50 UNITS/DAY     No current facility-administered medications on file prior to visit.       Family History   Problem Relation Age of Onset    Asthma Mother     ADD / ADHD Father     Hyperlipidemia Father     Hypertension Father     Birth defects Maternal Grandmother     Birth defects Paternal Grandmother        Social History     Socioeconomic History    Marital status:    Tobacco Use    Smoking status: Never    Smokeless tobacco: Never   Vaping Use    Vaping status: Never Used   Substance and Sexual Activity    Alcohol use: Yes     Comment: 1-2 alcoholic drinks a month / every other month    Drug use: No    Sexual activity: Yes      "Partners: Female     Birth control/protection: OCP         Review of Systems   Constitutional:  Negative for chills, fatigue, fever and unexpected weight change.   HENT:  Negative for congestion, ear pain, hearing loss, rhinorrhea, sinus pressure, sore throat and trouble swallowing.    Eyes:  Negative for discharge and itching.   Respiratory:  Negative for cough, chest tightness and shortness of breath.    Cardiovascular:  Negative for chest pain, palpitations and leg swelling.   Gastrointestinal:  Negative for abdominal pain, blood in stool, constipation, diarrhea and vomiting.        AM \"bubble gut\"   Endocrine: Negative for polydipsia and polyuria.   Genitourinary:  Negative for difficulty urinating, dysuria, enuresis, frequency, hematuria and urgency.   Musculoskeletal:  Negative for arthralgias, back pain, gait problem and joint swelling.   Skin:  Negative for rash and wound.   Allergic/Immunologic: Negative for immunocompromised state.   Neurological:  Positive for dizziness (rare) and light-headedness (rare). Negative for syncope, weakness, numbness and headaches.   Hematological:  Does not bruise/bleed easily.   Psychiatric/Behavioral:  Negative for behavioral problems, dysphoric mood and sleep disturbance. The patient is not nervous/anxious.        /74 (BP Location: Left arm, Patient Position: Sitting)   Pulse 66   Ht 175.3 cm (69.02\")   Wt 96.2 kg (212 lb)   SpO2 98%   BMI 31.29 kg/m²       Physical Exam  Constitutional:       Appearance: Normal appearance. He is well-developed.   HENT:      Head: Normocephalic and atraumatic.      Right Ear: External ear normal.      Left Ear: External ear normal.      Nose: Nose normal.      Mouth/Throat:      Mouth: Mucous membranes are moist.      Pharynx: Oropharynx is clear.   Eyes:      Extraocular Movements: Extraocular movements intact.      Conjunctiva/sclera: Conjunctivae normal.      Pupils: Pupils are equal, round, and reactive to light. "   Cardiovascular:      Rate and Rhythm: Normal rate and regular rhythm.      Heart sounds: Normal heart sounds.   Pulmonary:      Effort: Pulmonary effort is normal.      Breath sounds: Normal breath sounds.   Abdominal:      General: Bowel sounds are normal.      Palpations: Abdomen is soft.   Genitourinary:     Penis: Normal.       Testes: Normal.   Musculoskeletal:         General: Normal range of motion.      Cervical back: Normal range of motion and neck supple.   Lymphadenopathy:      Cervical: No cervical adenopathy.   Skin:     General: Skin is warm and dry.   Neurological:      General: No focal deficit present.      Mental Status: He is alert and oriented to person, place, and time.   Psychiatric:         Mood and Affect: Mood normal.         Behavior: Behavior normal.         Thought Content: Thought content normal.         Procedure:      Discussion/Summary:    HME-counseled on diet and exercise, fasting labs today  DM-attempting diet control     5/21 labs reviewed and dw patient     Reviewed the following with the patient: advised patient to avoid alcoholic beverages, encouraged patient to exercise 5-7 days per week for 30 minutes at a time, ideal body weight discussed with patient and weight loss encouraged.    Current Outpatient Medications:     Continuous Blood Gluc Sensor (Dexcom G7 Sensor) misc, Use 1 each Every 10 (Ten) Days., Disp: 3 each, Rfl: 11    Insulin Aspart (NovoLOG) 100 UNIT/ML injection, Use as directed in insulin pump MAX 95 units/day, NEW INSTRUCTIONS, Disp: 30 mL, Rfl: 6    Insulin Infusion Pump (T:slim X2 Insulin Pump) device, t:slim X2 Insulin Pump  12a 1.25, 2a 1.4, 7a 1.25; carb ratio 1:8, correction 1:35, target 120; AIT 4h, Disp: , Rfl:     Insulin Infusion Pump Supplies (AutoSoft XC Infusion Set) misc, Use 1 each Every Other Day. NEEDS APPT FOR REFILL, Disp: 45 each, Rfl: 0    Insulin Infusion Pump Supplies (T:slim X2 3mL Cartridge) misc, Use 1 each Every Other Day. NEEDS APPT  FOR REFILL, Disp: 45 each, Rfl: 0    NovoLOG FlexPen 100 UNIT/ML solution pen-injector sc pen, USE AS DIRECTED IN CASE OF PUMP FAILURE UP TO 50 UNITS/DAY, Disp: 15 mL, Rfl: 1        Diagnoses and all orders for this visit:    1. Type 1 diabetes mellitus without complication (Primary)  -     Hemoglobin A1c  -     Microalbumin / Creatinine Urine Ratio - Urine, Clean Catch    2. Routine general medical examination at a health care facility  -     CBC (No Diff)  -     Comprehensive Metabolic Panel  -     Hemoglobin A1c  -     Lipid Panel  -     TSH  -     Vitamin B12  -     Microalbumin / Creatinine Urine Ratio - Urine, Clean Catch  -     POC Urinalysis Dipstick, Automated

## 2024-05-22 LAB
ALBUMIN SERPL-MCNC: 4.6 G/DL (ref 3.5–5.2)
ALBUMIN UR-MCNC: <1.2 MG/DL
ALBUMIN/GLOB SERPL: 1.8 G/DL
ALP SERPL-CCNC: 70 U/L (ref 39–117)
ALT SERPL W P-5'-P-CCNC: 11 U/L (ref 1–41)
ANION GAP SERPL CALCULATED.3IONS-SCNC: 13 MMOL/L (ref 5–15)
AST SERPL-CCNC: 10 U/L (ref 1–40)
BILIRUB SERPL-MCNC: 0.6 MG/DL (ref 0–1.2)
BUN SERPL-MCNC: 8 MG/DL (ref 6–20)
BUN/CREAT SERPL: 11.6 (ref 7–25)
CALCIUM SPEC-SCNC: 9.3 MG/DL (ref 8.6–10.5)
CHLORIDE SERPL-SCNC: 104 MMOL/L (ref 98–107)
CHOLEST SERPL-MCNC: 127 MG/DL (ref 0–200)
CO2 SERPL-SCNC: 22 MMOL/L (ref 22–29)
CREAT SERPL-MCNC: 0.69 MG/DL (ref 0.76–1.27)
CREAT UR-MCNC: 97 MG/DL
EGFRCR SERPLBLD CKD-EPI 2021: 133.4 ML/MIN/1.73
GLOBULIN UR ELPH-MCNC: 2.5 GM/DL
GLUCOSE SERPL-MCNC: 106 MG/DL (ref 65–99)
HDLC SERPL-MCNC: 51 MG/DL (ref 40–60)
LDLC SERPL CALC-MCNC: 61 MG/DL (ref 0–100)
LDLC/HDLC SERPL: 1.21 {RATIO}
MICROALBUMIN/CREAT UR: NORMAL MG/G{CREAT}
POTASSIUM SERPL-SCNC: 3.9 MMOL/L (ref 3.5–5.2)
PROT SERPL-MCNC: 7.1 G/DL (ref 6–8.5)
SODIUM SERPL-SCNC: 139 MMOL/L (ref 136–145)
TRIGL SERPL-MCNC: 72 MG/DL (ref 0–150)
TSH SERPL DL<=0.05 MIU/L-ACNC: 2.3 UIU/ML (ref 0.27–4.2)
VIT B12 BLD-MCNC: 459 PG/ML (ref 211–946)
VLDLC SERPL-MCNC: 15 MG/DL (ref 5–40)

## 2024-07-16 RX ORDER — INSULIN PUMP CARTRIDGE
CARTRIDGE (EA) SUBCUTANEOUS
Qty: 45 EACH | Refills: 5 | Status: SHIPPED | OUTPATIENT
Start: 2024-07-16

## 2024-07-16 RX ORDER — INSULIN INFUSION SET/CARTRIDGE
COMBINATION PACKAGE (EA) MISCELLANEOUS
Qty: 45 EACH | Refills: 5 | Status: SHIPPED | OUTPATIENT
Start: 2024-07-16

## 2024-07-24 ENCOUNTER — TELEPHONE (OUTPATIENT)
Dept: ENDOCRINOLOGY | Facility: CLINIC | Age: 24
End: 2024-07-24
Payer: COMMERCIAL

## 2024-07-24 ENCOUNTER — PRIOR AUTHORIZATION (OUTPATIENT)
Dept: ENDOCRINOLOGY | Facility: CLINIC | Age: 24
End: 2024-07-24
Payer: COMMERCIAL

## 2024-07-24 NOTE — TELEPHONE ENCOUNTER
"Spoke to [facility] - asked them to fax the denial.  She stated they have been faxing request for pa.  Gave her the fax # and asked her to include the \"key\" for cover my meds  "

## 2024-08-15 RX ORDER — INSULIN ASPART 100 [IU]/ML
INJECTION, SOLUTION INTRAVENOUS; SUBCUTANEOUS
Qty: 90 ML | Refills: 0 | Status: SHIPPED | OUTPATIENT
Start: 2024-08-15

## 2024-08-15 NOTE — TELEPHONE ENCOUNTER
Rx Refill Note  Requested Prescriptions      No prescriptions requested or ordered in this encounter        Last office visit with prescribing clinician: 12/11/2023      Next office visit with prescribing clinician: Visit date not found       Jessica Olmstead (Jodi)  08/15/24, 11:40 EDT     Message send to schedule appt

## 2024-10-15 RX ORDER — ACYCLOVIR 400 MG/1
1 TABLET ORAL
Qty: 3 EACH | Refills: 0 | Status: SHIPPED | OUTPATIENT
Start: 2024-10-15

## 2024-10-15 NOTE — TELEPHONE ENCOUNTER
Patient requires an appointment for updated refills.Rx Refill Note  Requested Prescriptions     Pending Prescriptions Disp Refills    Continuous Glucose Sensor (Dexcom G7 Sensor) misc 3 each 0     Sig: Use 1 each Every 10 (Ten) Days.      Last office visit with prescribing clinician: 12/11/2023   Last telemedicine visit with prescribing clinician: Visit date not found   Next office visit with prescribing clinician: Visit date not found                         Would you like a call back once the refill request has been completed: [] Yes [] No    If the office needs to give you a call back, can they leave a voicemail: [] Yes [] No    Dagmar Acosta MA  10/15/24, 13:54 EDT

## 2024-11-14 RX ORDER — INSULIN ASPART 100 [IU]/ML
INJECTION, SOLUTION INTRAVENOUS; SUBCUTANEOUS
Qty: 90 ML | Refills: 0 | Status: SHIPPED | OUTPATIENT
Start: 2024-11-14

## 2024-11-14 NOTE — TELEPHONE ENCOUNTER
Rx Refill Note  Requested Prescriptions      No prescriptions requested or ordered in this encounter        Last office visit with prescribing clinician: 12/11/2023      Next office visit with prescribing clinician: Visit date not found       Jessica Olmstead (Jodi)  11/14/24, 15:07 EST     NOTED NEEDS APPT FOR REFILL ON INSULIN REFILL

## 2024-11-21 ENCOUNTER — PRIOR AUTHORIZATION (OUTPATIENT)
Dept: ENDOCRINOLOGY | Facility: CLINIC | Age: 24
End: 2024-11-21

## 2024-11-21 ENCOUNTER — OFFICE VISIT (OUTPATIENT)
Dept: ENDOCRINOLOGY | Facility: CLINIC | Age: 24
End: 2024-11-21
Payer: COMMERCIAL

## 2024-11-21 VITALS
BODY MASS INDEX: 31.25 KG/M2 | WEIGHT: 211 LBS | OXYGEN SATURATION: 98 % | DIASTOLIC BLOOD PRESSURE: 80 MMHG | SYSTOLIC BLOOD PRESSURE: 136 MMHG | HEIGHT: 69 IN | HEART RATE: 64 BPM

## 2024-11-21 DIAGNOSIS — Z96.41 PRESENCE OF INSULIN PUMP: ICD-10-CM

## 2024-11-21 DIAGNOSIS — E10.65 TYPE 1 DIABETES MELLITUS WITH HYPERGLYCEMIA: Primary | ICD-10-CM

## 2024-11-21 LAB
EXPIRATION DATE: ABNORMAL
EXPIRATION DATE: NORMAL
GLUCOSE BLDC GLUCOMTR-MCNC: 117 MG/DL (ref 70–130)
HBA1C MFR BLD: 7.6 % (ref 4.5–5.7)
Lab: ABNORMAL
Lab: NORMAL

## 2024-11-21 RX ORDER — ACYCLOVIR 400 MG/1
1 TABLET ORAL
Qty: 9 EACH | Refills: 3 | Status: SHIPPED | OUTPATIENT
Start: 2024-11-21

## 2024-11-21 RX ORDER — INSULIN ASPART 100 [IU]/ML
INJECTION, SOLUTION INTRAVENOUS; SUBCUTANEOUS
Qty: 90 ML | Refills: 2 | Status: SHIPPED | OUTPATIENT
Start: 2024-11-21

## 2024-11-21 NOTE — TELEPHONE ENCOUNTER
GUS [employer]ONE[employer] (Small: [id number])  PA Case ID #: [id number]  Rx #: [id number]  Need Help? Call us at [phone]  Outcome  Approved today by [employer] TAVARESP 2017  Your PA request has been approved. Additional information will be provided in the approval communication. (Message 114)  Authorization Expiration Date: 11/21/2025  Drug  Dexcom G7 Sensor  ePA cloud logo  Form  [employer] Electronic PA Form (2017 [employer])

## 2024-11-21 NOTE — PROGRESS NOTES
"     Office Note      Date: 2024  Patient Name: Darien Wylie  MRN: [id number]  : 2000    Chief Complaint   Patient presents with    Diabetes     Type 1        History of Present Illness:   Darien Wylie is a 24 y.o. male who presents for follow-up for type 1 diabetes diagnosed in .  It has been almost a year since his last appointment.  He remains on the tandem insulin pump he is using the Dexcom G7 continuous glucose monitor.  He reports he has been bolusing pretty regularly recently.  He reports life has been stressful recently he has a new job now and a baby girl on the way in April.  He denies any severe or frequent hypoglycemia.  He is due for his eye exam and will work on getting this scheduled.  He had fasting labs completed with his primary care physician in May.  He denies any trouble with his feet today.      Subjective     Review of Systems:   Review of Systems   Constitutional: Negative.    Cardiovascular: Negative.    Gastrointestinal: Negative.    Endocrine: Negative.    Neurological: Negative.        The following portions of the patient's history were reviewed and updated as appropriate: allergies, current medications, past family history, past medical history, past social history, past surgical history, and problem list.    Objective     Vitals:    24 1413   BP: 136/80   BP Location: Left arm   Patient Position: Sitting   Pulse: 64   SpO2: 98%   Weight: 95.7 kg (211 lb)   Height: 175.3 cm (69\")     Body mass index is 31.16 kg/m².    Physical Exam  Vitals reviewed.   Constitutional:       General: He is not in acute distress.     Appearance: Normal appearance.   Cardiovascular:      Pulses:           Dorsalis pedis pulses are 2+ on the right side and 2+ on the left side.        Posterior tibial pulses are 2+ on the right side and 2+ on the left side.   Musculoskeletal:      Right foot: No deformity.      Left foot: No deformity.   Feet:      Right foot: "      Protective Sensation: 5 sites tested.  5 sites sensed.      Skin integrity: Skin integrity normal.      Toenail Condition: Right toenails are normal.      Left foot:      Protective Sensation: 5 sites tested.  5 sites sensed.      Skin integrity: Skin integrity normal.      Toenail Condition: Left toenails are normal.      Comments: Diabetic Foot Exam Performed and Monofilament Test Performed      Neurological:      Mental Status: He is alert.         HEMOGLOBIN A1C  Lab Results   Component Value Date    HGBA1C 7.6 (A) 11/21/2024       GLUCOSE  Glucose   Date Value Ref Range Status   11/21/2024 117 70 - 130 mg/dL Final       CMP  Lab Results   Component Value Date    GLUCOSE 106 (H) 05/21/2024    BUN 8 05/21/2024    CREATININE 0.69 (L) 05/21/2024    EGFRIFNONA 129 04/08/2021    BCR 11.6 05/21/2024    K 3.9 05/21/2024    CO2 22.0 05/21/2024    CALCIUM 9.3 05/21/2024    AST 10 05/21/2024    ALT 11 05/21/2024       LIPID PANEL  Lab Results   Component Value Date    CHOL 127 05/21/2024    TRIG 72 05/21/2024    HDL 51 05/21/2024    LDL 61 05/21/2024       URINE MICROALBUMIN/CREATININE RATIO  Microalbumin/Creatinine Ratio   Date Value Ref Range Status   05/21/2024   Final     Comment:     Unable to calculate       TSH  Lab Results   Component Value Date    TSH 2.300 05/21/2024       Assessment / Plan      Assessment & Plan:  1. Type 1 diabetes mellitus with hyperglycemia  His hemoglobin A1c is better than it was in December but up as compared to May at 7.6%.  This is above goal.  I reviewed his tandem insulin pump download.  His blood sugars do tend to go up during the day.  He reports he does often give additional insulin because his corrections are not very effective.  I changed his insulin to carb ratio from 7 AM to midnight from 1-10 to 1-8 and his correction factor from 7 AM to midnight from 1-30 to 1-20 to try to improve hyperglycemia.  I encouraged him to reach out with blood sugar readings as needed for  further adjustment.  His weight is up 1 pound since his appointment in December last year.  I encouraged healthy eating habits and physical activity as tolerated.  His systolic blood pressure is up some today.  This is unusual for him.  We will continue to monitor this in the future.  His urine microalbumin/creatinine ratio was normal in May.  His cholesterol numbers are to goal and his metabolic panel looked good.  I refilled his Dexcom supplies and insulin today.  - POC Glucose, Blood  - POC Glycosylated Hemoglobin (Hb A1C)    2. Presence of insulin pump        Return in about 4 months (around 3/21/2025).     Electronically signed by: MARY Parnell  11/21/2024

## 2024-12-16 ENCOUNTER — OFFICE VISIT (OUTPATIENT)
Dept: INTERNAL MEDICINE | Facility: CLINIC | Age: 24
End: 2024-12-16
Payer: COMMERCIAL

## 2024-12-16 VITALS
HEIGHT: 69 IN | DIASTOLIC BLOOD PRESSURE: 76 MMHG | HEART RATE: 76 BPM | SYSTOLIC BLOOD PRESSURE: 126 MMHG | WEIGHT: 216 LBS | RESPIRATION RATE: 16 BRPM | TEMPERATURE: 99 F | BODY MASS INDEX: 31.99 KG/M2 | OXYGEN SATURATION: 97 %

## 2024-12-16 DIAGNOSIS — J06.9 UPPER RESPIRATORY TRACT INFECTION, UNSPECIFIED TYPE: Primary | ICD-10-CM

## 2024-12-16 DIAGNOSIS — H66.001 NON-RECURRENT ACUTE SUPPURATIVE OTITIS MEDIA OF RIGHT EAR WITHOUT SPONTANEOUS RUPTURE OF TYMPANIC MEMBRANE: ICD-10-CM

## 2024-12-16 LAB
EXPIRATION DATE: NORMAL
FLUAV AG UPPER RESP QL IA.RAPID: NOT DETECTED
FLUBV AG UPPER RESP QL IA.RAPID: NOT DETECTED
INTERNAL CONTROL: NORMAL
Lab: NORMAL
SARS-COV-2 AG UPPER RESP QL IA.RAPID: NOT DETECTED

## 2024-12-16 PROCEDURE — 87428 SARSCOV & INF VIR A&B AG IA: CPT | Performed by: NURSE PRACTITIONER

## 2024-12-16 PROCEDURE — 99214 OFFICE O/P EST MOD 30 MIN: CPT | Performed by: NURSE PRACTITIONER

## 2024-12-16 RX ORDER — BENZONATATE 100 MG/1
100 CAPSULE ORAL 3 TIMES DAILY PRN
Qty: 30 CAPSULE | Refills: 0 | Status: SHIPPED | OUTPATIENT
Start: 2024-12-16

## 2024-12-16 RX ORDER — INSULIN ASPART 100 [IU]/ML
INJECTION, SOLUTION INTRAVENOUS; SUBCUTANEOUS
Qty: 90 ML | Refills: 0 | OUTPATIENT
Start: 2024-12-16

## 2024-12-16 NOTE — TELEPHONE ENCOUNTER
Rx Refill Note  Requested Prescriptions     Refused Prescriptions Disp Refills    NovoLOG 100 UNIT/ML injection [Pharmacy Med Name: NovoLOG 100 UNIT/ML Subcutaneous Solution] 90 mL 0     Sig: USE AS DIRECTED IN INSULIN PUMP NOT TO EXCEED 95 UNITS PER DAY     Refused By: ARACELI MONTALVO     Reason for Refusal: Refill not appropriate          Last office visit with prescribing clinician: 11/21/2024     Next office visit with prescribing clinician: 3/20/2025         Araceli Montalvo MA  12/16/24, 14:45 EST

## 2024-12-16 NOTE — LETTER
December 16, 2024     Patient: Darien Wylie   YOB: 2000   Date of Visit: 12/16/2024       To Whom It May Concern:    It is my medical opinion that Darien Wylie may return to work on 12/18/24.         Sincerely,        DIXIE Avalos    CC: No Recipients

## 2024-12-16 NOTE — PROGRESS NOTES
Follow Up Office Visit      Patient Name: Darien Wylie  : 2000   MRN: [id number]   Care Team: Patient Care Team:  Chang Quintero MD as PCP - General (Internal Medicine)  Virgen Motta PA as Physician Assistant (Endocrinology)    Chief Complaint:    Chief Complaint   Patient presents with    Nasal Congestion    Cough       History of Present Illness: Darien Wylie is a 24 y.o. male with pertinent medical history significant for type 1 diabetes.  He presents today for acute onset of cough, congestion and general ill sensation.    Symptoms started Thursday morning upon waking with feeling generally ill but he was able to get through the workday.  Symptoms progressed to head congestion and bilateral ear pain.  He endorses productive cough but denies any shortness of air, sore throat, chest pain, nausea, vomiting or diarrhea.    He used saline sinus/nasal rinses but this seemed to close off the right ear.  He is having more pain in the right ear now.  He has also used OTC Mucinex which gives temporary relief of his cough symptoms.    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  He notes that his wife is currently pregnant and he fears getting her sick.      Type 1 diabetes-   BS at home has been his avg at 100-150.  He uses a CGM for monitoring.              Subjective       I have reviewed and the following portions of the patient's history were updated as appropriate: past family history, past medical history, past social history, past surgical history and problem list.    Medications:     Current Outpatient Medications:     Continuous Glucose Sensor (Dexcom G7 Sensor) misc, Use 1 each Every 10 (Ten) Days., Disp: 9 each, Rfl: 3    Insulin Aspart (NovoLOG) 100 UNIT/ML injection, Use as directed in insulin pump MAX 95 units/day MUST SCHEDULE AN APPT FOR REFILLS, Disp: 90 mL, Rfl: 2    Insulin Infusion Pump (T:slim X2 Insulin Pump)  "device, t:slim X2 Insulin Pump  12a 1.25, 2a 1.4, 7a 1.25; carb ratio 1:8, correction 1:35, target 120; AIT 4h, Disp: , Rfl:     Insulin Infusion Pump Supplies (AutoSoft XC Infusion Set) misc, CHANGE EVERY OTHER DAY, Disp: 45 each, Rfl: 5    Insulin Infusion Pump Supplies (T:slim X2 3mL Cartridge) misc, CHANGE EVERY OTHER DAY, Disp: 45 each, Rfl: 5    NovoLOG FlexPen 100 UNIT/ML solution pen-injector sc pen, USE AS DIRECTED IN CASE OF PUMP FAILURE UP TO 50 UNITS/DAY, Disp: 15 mL, Rfl: 1    amoxicillin-clavulanate (AUGMENTIN) 875-125 MG per tablet, Take 1 tablet by mouth 2 (Two) Times a Day for 7 days., Disp: 14 tablet, Rfl: 0    benzonatate (Tessalon Perles) 100 MG capsule, Take 1 capsule by mouth 3 (Three) Times a Day As Needed for Cough., Disp: 30 capsule, Rfl: 0    Allergies:   No Known Allergies    Objective     Physical Exam:  Vital Signs:   Vitals:    12/16/24 0938   BP: 126/76   BP Location: Left arm   Patient Position: Sitting   Cuff Size: Adult   Pulse: 76   Resp: 16   Temp: 99 °F (37.2 °C)   TempSrc: Tympanic   SpO2: 97%   Weight: 98 kg (216 lb)   Height: 175.3 cm (69\")     Body mass index is 31.9 kg/m².     Physical Exam  Vitals and nursing note reviewed.   Constitutional:       General: He is not in acute distress.  HENT:      Head: Normocephalic and atraumatic.      Right Ear: External ear normal.      Left Ear: Ear canal and external ear normal.      Ears:      Comments: Right TM opaque, erythematous and bulging     Mouth/Throat:      Mouth: Mucous membranes are moist.      Pharynx: Oropharynx is clear. No oropharyngeal exudate.   Eyes:      General: No scleral icterus.     Conjunctiva/sclera: Conjunctivae normal.   Cardiovascular:      Rate and Rhythm: Normal rate and regular rhythm.   Pulmonary:      Effort: Pulmonary effort is normal. No respiratory distress.      Breath sounds: No wheezing or rhonchi.   Musculoskeletal:      Cervical back: Neck supple. No tenderness.   Lymphadenopathy:      " Cervical: No cervical adenopathy.   Skin:     General: Skin is warm and dry.   Neurological:      Mental Status: He is alert. Mental status is at baseline.   Psychiatric:         Mood and Affect: Mood normal.         Assessment / Plan      Assessment/Plan:   Problems Addressed This Visit    ICD-10-CM ICD-9-CM   1. Upper respiratory tract infection, unspecified type  J06.9 465.9   2. Non-recurrent acute suppurative otitis media of right ear without spontaneous rupture of tympanic membrane  H66.001 382.00      URI with cough  -Flu and COVID test negative in office today  -Recommend fluids and rest, return to work on 12/18/2020  -May continue use of OTC therapies such as Mucinex for symptom management  -Benzonatate 200 mg 3 times daily as needed for cough      Right acute otitis media  -Augmentin 875/125 mg twice daily x 7 days      Type 1 diabetes  -Readings at home reported average per patient  -Encouraged to continue to monitoring    Plan of care reviewed with patient at the conclusion of today's visit. Education was provided regarding diagnosis and management.  Patient verbalizes understanding of and agreement with management plan.        Follow Up:   Return if symptoms worsen or fail to improve, for Next scheduled follow up.        DIXIE Davalos  [facility] [address]    Please note that portions of this note were completed with a voice recognition program.

## 2024-12-26 RX ORDER — INSULIN PUMP CARTRIDGE
CARTRIDGE (EA) SUBCUTANEOUS
Qty: 45 EACH | Refills: 9 | Status: SHIPPED | OUTPATIENT
Start: 2024-12-26

## 2024-12-26 NOTE — TELEPHONE ENCOUNTER
Rx Refill Note  Requested Prescriptions     Pending Prescriptions Disp Refills    Insulin Infusion Pump Supplies (T:slim X2 3mL Cartridge) misc [Pharmacy Med Name: T:SLIM X2 3 ML CARTRIDGE 3ML CART MISC] 45 each 9     Sig: CHANGE EVERY OTHER DAY          Last office visit with prescribing clinician: 11/21/2024     Next office visit with prescribing clinician: 3/20/2025   }    Darien Wheat MA  12/26/24, 10:42 EST

## 2025-05-27 ENCOUNTER — OFFICE VISIT (OUTPATIENT)
Dept: INTERNAL MEDICINE | Age: 25
End: 2025-05-27
Payer: COMMERCIAL

## 2025-05-27 ENCOUNTER — LAB (OUTPATIENT)
Dept: INTERNAL MEDICINE | Age: 25
End: 2025-05-27
Payer: COMMERCIAL

## 2025-05-27 VITALS
SYSTOLIC BLOOD PRESSURE: 110 MMHG | BODY MASS INDEX: 33.62 KG/M2 | DIASTOLIC BLOOD PRESSURE: 76 MMHG | HEIGHT: 69 IN | OXYGEN SATURATION: 99 % | WEIGHT: 227 LBS | HEART RATE: 81 BPM

## 2025-05-27 DIAGNOSIS — E10.9 TYPE 1 DIABETES MELLITUS WITHOUT COMPLICATION: Primary | ICD-10-CM

## 2025-05-27 DIAGNOSIS — Z00.00 ROUTINE GENERAL MEDICAL EXAMINATION AT A HEALTH CARE FACILITY: ICD-10-CM

## 2025-05-27 DIAGNOSIS — Z00.00 HEALTHCARE MAINTENANCE: Primary | ICD-10-CM

## 2025-05-27 LAB
BILIRUB BLD-MCNC: NEGATIVE MG/DL
CLARITY, POC: CLEAR
COLOR UR: YELLOW
DEPRECATED RDW RBC AUTO: 42.3 FL (ref 37–54)
ERYTHROCYTE [DISTWIDTH] IN BLOOD BY AUTOMATED COUNT: 13.1 % (ref 12.3–15.4)
EXPIRATION DATE: NORMAL
GLUCOSE UR STRIP-MCNC: NEGATIVE MG/DL
HBA1C MFR BLD: 7.4 % (ref 4.8–5.6)
HCT VFR BLD AUTO: 49.9 % (ref 37.5–51)
HGB BLD-MCNC: 16.6 G/DL (ref 13–17.7)
KETONES UR QL: NEGATIVE
LEUKOCYTE EST, POC: NEGATIVE
Lab: NORMAL
MCH RBC QN AUTO: 29.5 PG (ref 26.6–33)
MCHC RBC AUTO-ENTMCNC: 33.3 G/DL (ref 31.5–35.7)
MCV RBC AUTO: 88.6 FL (ref 79–97)
NITRITE UR-MCNC: NEGATIVE MG/ML
PH UR: 6.5 [PH] (ref 5–8)
PLATELET # BLD AUTO: 248 10*3/MM3 (ref 140–450)
PMV BLD AUTO: 9.7 FL (ref 6–12)
PROT UR STRIP-MCNC: NEGATIVE MG/DL
RBC # BLD AUTO: 5.63 10*6/MM3 (ref 4.14–5.8)
RBC # UR STRIP: NEGATIVE /UL
SP GR UR: 1.01 (ref 1–1.03)
UROBILINOGEN UR QL: NORMAL
WBC NRBC COR # BLD AUTO: 11.84 10*3/MM3 (ref 3.4–10.8)

## 2025-05-27 PROCEDURE — 82043 UR ALBUMIN QUANTITATIVE: CPT | Performed by: INTERNAL MEDICINE

## 2025-05-27 PROCEDURE — 36415 COLL VENOUS BLD VENIPUNCTURE: CPT | Performed by: INTERNAL MEDICINE

## 2025-05-27 PROCEDURE — 80061 LIPID PANEL: CPT | Performed by: INTERNAL MEDICINE

## 2025-05-27 PROCEDURE — 82607 VITAMIN B-12: CPT | Performed by: INTERNAL MEDICINE

## 2025-05-27 PROCEDURE — 83036 HEMOGLOBIN GLYCOSYLATED A1C: CPT | Performed by: INTERNAL MEDICINE

## 2025-05-27 PROCEDURE — 82570 ASSAY OF URINE CREATININE: CPT | Performed by: INTERNAL MEDICINE

## 2025-05-27 PROCEDURE — 80050 GENERAL HEALTH PANEL: CPT | Performed by: INTERNAL MEDICINE

## 2025-05-27 NOTE — PROGRESS NOTES
Patient is a 24 y.o. male who is here for a physical.  Chief Complaint   Patient presents with   • Annual Exam         HPI:    Here for CPE.      History:     Patient Active Problem List   Diagnosis   • Diabetes mellitus type I   • Presence of insulin pump   • Type 1 diabetes mellitus       Past Medical History:   Diagnosis Date   • Allergic    • Diabetes mellitus type I    • Presence of insulin pump    • Type 1 diabetes mellitus 2013       Past Surgical History:   Procedure Laterality Date   • WISDOM TOOTH EXTRACTION         Current Outpatient Medications on File Prior to Visit   Medication Sig   • Continuous Glucose Sensor (Dexcom G7 Sensor) misc Use 1 each Every 10 (Ten) Days.   • Insulin Aspart (NovoLOG) 100 UNIT/ML injection Use as directed in insulin pump MAX 95 units/day MUST SCHEDULE AN APPT FOR REFILLS   • Insulin Infusion Pump (T:slim X2 Insulin Pump) device t:slim X2 Insulin Pump   12a 1.25, 2a 1.4, 7a 1.25; carb ratio 1:8, correction 1:35, target 120; AIT 4h   • Insulin Infusion Pump Supplies (AutoSoft XC Infusion Set) misc CHANGE EVERY OTHER DAY   • Insulin Infusion Pump Supplies (T:slim X2 3mL Cartridge) misc CHANGE EVERY OTHER DAY   • NovoLOG FlexPen 100 UNIT/ML solution pen-injector sc pen USE AS DIRECTED IN CASE OF PUMP FAILURE UP TO 50 UNITS/DAY   • [DISCONTINUED] benzonatate (Tessalon Perles) 100 MG capsule Take 1 capsule by mouth 3 (Three) Times a Day As Needed for Cough.     No current facility-administered medications on file prior to visit.       Family History   Problem Relation Age of Onset   • Asthma Mother    • Obesity Mother    • ADD / ADHD Father    • Hyperlipidemia Father    • Hypertension Father    • Obesity Father    • Birth defects Maternal Grandmother    • Birth defects Paternal Grandmother        Social History     Socioeconomic History   • Marital status:    Tobacco Use   • Smoking status: Never     Passive exposure: Never   • Smokeless tobacco: Never   Vaping Use   •  "Vaping status: Never Used   Substance and Sexual Activity   • Alcohol use: Not Currently     Comment: 1-2 alcoholic drinks a month / every other month   • Drug use: No   • Sexual activity: Yes     Partners: Female     Birth control/protection: None         Review of Systems   Constitutional:  Positive for unexpected weight change (gain). Negative for chills, fatigue and fever.   HENT:  Negative for congestion, ear pain, hearing loss, rhinorrhea, sinus pressure, sore throat and trouble swallowing.    Eyes:  Negative for discharge and itching.   Respiratory:  Negative for cough, chest tightness and shortness of breath.    Cardiovascular:  Negative for chest pain, palpitations and leg swelling.   Gastrointestinal:  Negative for abdominal pain, blood in stool, constipation, diarrhea and vomiting.        AM \"bubble gut\"   Endocrine: Negative for polydipsia and polyuria.   Genitourinary:  Negative for difficulty urinating, dysuria, enuresis, frequency, hematuria and urgency.   Musculoskeletal:  Negative for arthralgias, back pain, gait problem and joint swelling.   Skin:  Negative for rash and wound.   Allergic/Immunologic: Negative for immunocompromised state.   Neurological:  Positive for dizziness (rare) and light-headedness (rare). Negative for syncope, weakness, numbness and headaches.   Hematological:  Does not bruise/bleed easily.   Psychiatric/Behavioral:  Negative for behavioral problems, dysphoric mood and sleep disturbance. The patient is not nervous/anxious.        Ht 175.3 cm (69.02\")   BMI 31.88 kg/m²       Physical Exam  Constitutional:       Appearance: Normal appearance. He is well-developed.   HENT:      Head: Normocephalic and atraumatic.      Right Ear: External ear normal.      Left Ear: External ear normal.      Nose: Nose normal.      Mouth/Throat:      Mouth: Mucous membranes are moist.      Pharynx: Oropharynx is clear.   Eyes:      Extraocular Movements: Extraocular movements intact.      " Conjunctiva/sclera: Conjunctivae normal.      Pupils: Pupils are equal, round, and reactive to light.   Cardiovascular:      Rate and Rhythm: Normal rate and regular rhythm.      Heart sounds: Normal heart sounds.   Pulmonary:      Effort: Pulmonary effort is normal.      Breath sounds: Normal breath sounds.   Abdominal:      General: Bowel sounds are normal.      Palpations: Abdomen is soft.   Genitourinary:     Penis: Normal.       Testes: Normal.   Musculoskeletal:         General: Normal range of motion.      Cervical back: Normal range of motion and neck supple.   Lymphadenopathy:      Cervical: No cervical adenopathy.   Skin:     General: Skin is warm and dry.   Neurological:      General: No focal deficit present.      Mental Status: He is alert and oriented to person, place, and time.   Psychiatric:         Mood and Affect: Mood normal.         Behavior: Behavior normal.         Thought Content: Thought content normal.     Procedure:      Historical Data:    HME-counseled on diet and exercise, fasting labs today  DM-attempting diet control,labs today     5/27 labs reviewed and dw patient     Reviewed the following with the patient: advised patient to avoid alcoholic beverages, encouraged patient to exercise 5-7 days per week for 30 minutes at a time, ideal body weight discussed with patient and weight loss encouraged.    Current Outpatient Medications:   •  Continuous Glucose Sensor (Dexcom G7 Sensor) misc, Use 1 each Every 10 (Ten) Days., Disp: 9 each, Rfl: 3  •  Insulin Aspart (NovoLOG) 100 UNIT/ML injection, Use as directed in insulin pump MAX 95 units/day MUST SCHEDULE AN APPT FOR REFILLS, Disp: 90 mL, Rfl: 2  •  Insulin Infusion Pump (T:slim X2 Insulin Pump) device, t:slim X2 Insulin Pump  12a 1.25, 2a 1.4, 7a 1.25; carb ratio 1:8, correction 1:35, target 120; AIT 4h, Disp: , Rfl:   •  Insulin Infusion Pump Supplies (AutoSoft XC Infusion Set) misc, CHANGE EVERY OTHER DAY, Disp: 45 each, Rfl: 5  •   Insulin Infusion Pump Supplies (T:slim X2 3mL Cartridge) misc, CHANGE EVERY OTHER DAY, Disp: 45 each, Rfl: 9  •  NovoLOG FlexPen 100 UNIT/ML solution pen-injector sc pen, USE AS DIRECTED IN CASE OF PUMP FAILURE UP TO 50 UNITS/DAY, Disp: 15 mL, Rfl: 1        There are no diagnoses linked to this encounter.

## 2025-05-28 LAB
ALBUMIN SERPL-MCNC: 4.1 G/DL (ref 3.5–5.2)
ALBUMIN UR-MCNC: <1.2 MG/DL
ALBUMIN/GLOB SERPL: 1.4 G/DL
ALP SERPL-CCNC: 76 U/L (ref 39–117)
ALT SERPL W P-5'-P-CCNC: 39 U/L (ref 1–41)
ANION GAP SERPL CALCULATED.3IONS-SCNC: 11.7 MMOL/L (ref 5–15)
AST SERPL-CCNC: 35 U/L (ref 1–40)
BILIRUB SERPL-MCNC: 0.4 MG/DL (ref 0–1.2)
BUN SERPL-MCNC: 11 MG/DL (ref 6–20)
BUN/CREAT SERPL: 12.5 (ref 7–25)
CALCIUM SPEC-SCNC: 9.2 MG/DL (ref 8.6–10.5)
CHLORIDE SERPL-SCNC: 102 MMOL/L (ref 98–107)
CHOLEST SERPL-MCNC: 143 MG/DL (ref 0–200)
CO2 SERPL-SCNC: 23.3 MMOL/L (ref 22–29)
CREAT SERPL-MCNC: 0.88 MG/DL (ref 0.76–1.27)
CREAT UR-MCNC: 160 MG/DL
EGFRCR SERPLBLD CKD-EPI 2021: 123.1 ML/MIN/1.73
GLOBULIN UR ELPH-MCNC: 2.9 GM/DL
GLUCOSE SERPL-MCNC: 129 MG/DL (ref 65–99)
HDLC SERPL-MCNC: 47 MG/DL (ref 40–60)
LDLC SERPL CALC-MCNC: 86 MG/DL (ref 0–100)
LDLC/HDLC SERPL: 1.85 {RATIO}
MICROALBUMIN/CREAT UR: NORMAL MG/G{CREAT}
POTASSIUM SERPL-SCNC: 4.3 MMOL/L (ref 3.5–5.2)
PROT SERPL-MCNC: 7 G/DL (ref 6–8.5)
SODIUM SERPL-SCNC: 137 MMOL/L (ref 136–145)
TRIGL SERPL-MCNC: 46 MG/DL (ref 0–150)
TSH SERPL DL<=0.05 MIU/L-ACNC: 3 UIU/ML (ref 0.27–4.2)
VIT B12 BLD-MCNC: 658 PG/ML (ref 211–946)
VLDLC SERPL-MCNC: 10 MG/DL (ref 5–40)

## 2025-05-29 RX ORDER — INSULIN PUMP CARTRIDGE
CARTRIDGE (EA) SUBCUTANEOUS
Qty: 45 EACH | Refills: 3 | Status: SHIPPED | OUTPATIENT
Start: 2025-05-29

## 2025-05-29 RX ORDER — INSULIN INFUSION SET/CARTRIDGE
COMBINATION PACKAGE (EA) MISCELLANEOUS
Qty: 45 EACH | Refills: 3 | Status: SHIPPED | OUTPATIENT
Start: 2025-05-29

## 2025-06-06 RX ORDER — TRIAMCINOLONE ACETONIDE 1 MG/G
1 CREAM TOPICAL 2 TIMES DAILY PRN
Qty: 80 G | Refills: 2 | Status: SHIPPED | OUTPATIENT
Start: 2025-06-06

## 2025-06-06 RX ORDER — HYDROXYZINE HYDROCHLORIDE 25 MG/1
25 TABLET, FILM COATED ORAL EVERY 6 HOURS PRN
Qty: 60 TABLET | Refills: 2 | Status: SHIPPED | OUTPATIENT
Start: 2025-06-06

## 2025-06-11 RX ORDER — ACYCLOVIR 400 MG/1
1 TABLET ORAL
Qty: 3 EACH | Refills: 0 | Status: SHIPPED | OUTPATIENT
Start: 2025-06-11

## 2025-08-04 ENCOUNTER — OFFICE VISIT (OUTPATIENT)
Dept: ENDOCRINOLOGY | Facility: CLINIC | Age: 25
End: 2025-08-04
Payer: COMMERCIAL

## 2025-08-04 VITALS
OXYGEN SATURATION: 98 % | HEIGHT: 69 IN | DIASTOLIC BLOOD PRESSURE: 78 MMHG | SYSTOLIC BLOOD PRESSURE: 122 MMHG | BODY MASS INDEX: 33.5 KG/M2 | HEART RATE: 72 BPM

## 2025-08-04 DIAGNOSIS — E10.65 TYPE 1 DIABETES MELLITUS WITH HYPERGLYCEMIA: Primary | ICD-10-CM

## 2025-08-04 LAB
EXPIRATION DATE: ABNORMAL
EXPIRATION DATE: ABNORMAL
GLUCOSE BLDC GLUCOMTR-MCNC: 211 MG/DL (ref 70–130)
HBA1C MFR BLD: 7.7 % (ref 4.5–5.7)
Lab: ABNORMAL
Lab: ABNORMAL

## 2025-08-04 PROCEDURE — 83036 HEMOGLOBIN GLYCOSYLATED A1C: CPT | Performed by: STUDENT IN AN ORGANIZED HEALTH CARE EDUCATION/TRAINING PROGRAM

## 2025-08-04 PROCEDURE — 82947 ASSAY GLUCOSE BLOOD QUANT: CPT | Performed by: STUDENT IN AN ORGANIZED HEALTH CARE EDUCATION/TRAINING PROGRAM

## 2025-08-04 PROCEDURE — 99213 OFFICE O/P EST LOW 20 MIN: CPT | Performed by: STUDENT IN AN ORGANIZED HEALTH CARE EDUCATION/TRAINING PROGRAM

## 2025-08-04 RX ORDER — ACYCLOVIR 400 MG/1
1 TABLET ORAL
Qty: 9 EACH | Refills: 1 | Status: SHIPPED | OUTPATIENT
Start: 2025-08-04

## 2025-08-11 RX ORDER — INSULIN INFUSION SET/CARTRIDGE
COMBINATION PACKAGE (EA) MISCELLANEOUS
Qty: 90 EACH | Refills: 3 | OUTPATIENT
Start: 2025-08-11